# Patient Record
Sex: FEMALE | Race: WHITE | NOT HISPANIC OR LATINO | URBAN - METROPOLITAN AREA
[De-identification: names, ages, dates, MRNs, and addresses within clinical notes are randomized per-mention and may not be internally consistent; named-entity substitution may affect disease eponyms.]

---

## 2017-01-23 ENCOUNTER — AMBULATORY SURGICAL CENTER (AMBULATORY)
Dept: URBAN - METROPOLITAN AREA SURGERY 17 | Facility: SURGERY | Age: 62
End: 2017-01-23
Payer: COMMERCIAL

## 2017-01-23 ENCOUNTER — OFFICE (AMBULATORY)
Dept: URBAN - METROPOLITAN AREA CLINIC 64 | Facility: CLINIC | Age: 62
End: 2017-01-23
Payer: COMMERCIAL

## 2017-01-23 VITALS
TEMPERATURE: 96.9 F | RESPIRATION RATE: 16 BRPM | OXYGEN SATURATION: 97 % | SYSTOLIC BLOOD PRESSURE: 116 MMHG | SYSTOLIC BLOOD PRESSURE: 176 MMHG | DIASTOLIC BLOOD PRESSURE: 76 MMHG | HEART RATE: 76 BPM | TEMPERATURE: 97.3 F | SYSTOLIC BLOOD PRESSURE: 134 MMHG | WEIGHT: 210 LBS | HEART RATE: 73 BPM | SYSTOLIC BLOOD PRESSURE: 149 MMHG | DIASTOLIC BLOOD PRESSURE: 89 MMHG | DIASTOLIC BLOOD PRESSURE: 55 MMHG | DIASTOLIC BLOOD PRESSURE: 78 MMHG | RESPIRATION RATE: 18 BRPM | HEART RATE: 74 BPM | DIASTOLIC BLOOD PRESSURE: 87 MMHG | HEIGHT: 67 IN | SYSTOLIC BLOOD PRESSURE: 129 MMHG | DIASTOLIC BLOOD PRESSURE: 108 MMHG | SYSTOLIC BLOOD PRESSURE: 115 MMHG | HEART RATE: 65 BPM | DIASTOLIC BLOOD PRESSURE: 75 MMHG | SYSTOLIC BLOOD PRESSURE: 141 MMHG | RESPIRATION RATE: 19 BRPM | HEART RATE: 75 BPM | HEART RATE: 63 BPM | OXYGEN SATURATION: 100 % | HEART RATE: 71 BPM | RESPIRATION RATE: 6 BRPM | OXYGEN SATURATION: 99 % | SYSTOLIC BLOOD PRESSURE: 117 MMHG | SYSTOLIC BLOOD PRESSURE: 127 MMHG | RESPIRATION RATE: 17 BRPM | DIASTOLIC BLOOD PRESSURE: 68 MMHG | DIASTOLIC BLOOD PRESSURE: 82 MMHG | HEART RATE: 68 BPM

## 2017-01-23 DIAGNOSIS — K62.1 RECTAL POLYP: ICD-10-CM

## 2017-01-23 DIAGNOSIS — D12.7 BENIGN NEOPLASM OF RECTOSIGMOID JUNCTION: ICD-10-CM

## 2017-01-23 DIAGNOSIS — Z86.010 PERSONAL HISTORY OF COLONIC POLYPS: ICD-10-CM

## 2017-01-23 DIAGNOSIS — D12.5 BENIGN NEOPLASM OF SIGMOID COLON: ICD-10-CM

## 2017-01-23 DIAGNOSIS — K64.8 OTHER HEMORRHOIDS: ICD-10-CM

## 2017-01-23 LAB
GI HISTOLOGY: A. UNSPECIFIED: (no result)
GI HISTOLOGY: B. SELECT: (no result)
GI HISTOLOGY: PDF REPORT: (no result)

## 2017-01-23 PROCEDURE — 88305 TISSUE EXAM BY PATHOLOGIST: CPT

## 2017-01-23 PROCEDURE — 45385 COLONOSCOPY W/LESION REMOVAL: CPT | Mod: 33

## 2017-01-23 RX ADMIN — PROPOFOL 50 MG: 10 INJECTION, EMULSION INTRAVENOUS at 12:52

## 2017-01-23 RX ADMIN — PROPOFOL 50 MG: 10 INJECTION, EMULSION INTRAVENOUS at 12:57

## 2017-01-23 RX ADMIN — PROPOFOL 50 MG: 10 INJECTION, EMULSION INTRAVENOUS at 12:48

## 2017-01-23 RX ADMIN — PROPOFOL 50 MG: 10 INJECTION, EMULSION INTRAVENOUS at 12:40

## 2017-01-23 RX ADMIN — PROPOFOL 50 MG: 10 INJECTION, EMULSION INTRAVENOUS at 12:46

## 2017-01-23 RX ADMIN — PROPOFOL 100 MG: 10 INJECTION, EMULSION INTRAVENOUS at 12:38

## 2017-01-23 RX ADMIN — PROPOFOL 50 MG: 10 INJECTION, EMULSION INTRAVENOUS at 12:44

## 2017-01-23 RX ADMIN — PROPOFOL 50 MG: 10 INJECTION, EMULSION INTRAVENOUS at 12:54

## 2017-01-23 RX ADMIN — PROPOFOL 50 MG: 10 INJECTION, EMULSION INTRAVENOUS at 12:59

## 2017-01-23 RX ADMIN — LIDOCAINE HYDROCHLORIDE 50 MG: 10 INJECTION, SOLUTION EPIDURAL; INFILTRATION; INTRACAUDAL; PERINEURAL at 12:38

## 2017-01-23 RX ADMIN — PROPOFOL 50 MG: 10 INJECTION, EMULSION INTRAVENOUS at 12:50

## 2017-01-23 RX ADMIN — PROPOFOL 50 MG: 10 INJECTION, EMULSION INTRAVENOUS at 12:42

## 2019-10-25 RX ORDER — LEVOTHYROXINE SODIUM 88 UG/1
88 TABLET ORAL DAILY
Qty: 30 TABLET | Refills: 1 | Status: SHIPPED | OUTPATIENT
Start: 2019-10-25 | End: 2020-01-08 | Stop reason: SDUPTHER

## 2019-10-25 RX ORDER — LOSARTAN POTASSIUM 50 MG/1
50 TABLET ORAL DAILY
Qty: 30 TABLET | Refills: 1 | Status: SHIPPED | OUTPATIENT
Start: 2019-10-25 | End: 2020-01-08 | Stop reason: SDUPTHER

## 2020-01-02 DIAGNOSIS — E03.9 HYPOTHYROIDISM, UNSPECIFIED TYPE: ICD-10-CM

## 2020-01-02 DIAGNOSIS — I10 HYPERTENSION, UNSPECIFIED TYPE: Primary | ICD-10-CM

## 2020-01-03 LAB
BASOPHILS # BLD AUTO: 0.01 10*3/MM3 (ref 0–0.2)
BASOPHILS NFR BLD AUTO: 0.2 % (ref 0–1.5)
CHOLEST SERPL-MCNC: 173 MG/DL (ref 0–200)
EOSINOPHIL # BLD AUTO: 0.2 10*3/MM3 (ref 0–0.4)
EOSINOPHIL NFR BLD AUTO: 3.1 % (ref 0.3–6.2)
ERYTHROCYTE [DISTWIDTH] IN BLOOD BY AUTOMATED COUNT: 14 % (ref 12.3–15.4)
HCT VFR BLD AUTO: 36.8 % (ref 34–46.6)
HDLC SERPL-MCNC: 36 MG/DL (ref 40–60)
HGB BLD-MCNC: 12 G/DL (ref 12–15.9)
IMM GRANULOCYTES # BLD AUTO: 0 10*3/MM3 (ref 0–0.05)
IMM GRANULOCYTES NFR BLD AUTO: 0 % (ref 0–0.5)
LDLC SERPL CALC-MCNC: 106 MG/DL (ref 0–100)
LYMPHOCYTES # BLD AUTO: 3.53 10*3/MM3 (ref 0.7–3.1)
LYMPHOCYTES NFR BLD AUTO: 54 % (ref 19.6–45.3)
MCH RBC QN AUTO: 27.6 PG (ref 26.6–33)
MCHC RBC AUTO-ENTMCNC: 32.6 G/DL (ref 31.5–35.7)
MCV RBC AUTO: 84.6 FL (ref 79–97)
MONOCYTES # BLD AUTO: 0.57 10*3/MM3 (ref 0.1–0.9)
MONOCYTES NFR BLD AUTO: 8.7 % (ref 5–12)
NEUTROPHILS # BLD AUTO: 2.23 10*3/MM3 (ref 1.7–7)
NEUTROPHILS NFR BLD AUTO: 34 % (ref 42.7–76)
NRBC BLD AUTO-RTO: 0 /100 WBC (ref 0–0.2)
PLATELET # BLD AUTO: 425 10*3/MM3 (ref 140–450)
RBC # BLD AUTO: 4.35 10*6/MM3 (ref 3.77–5.28)
TRIGL SERPL-MCNC: 156 MG/DL (ref 0–150)
TSH SERPL DL<=0.005 MIU/L-ACNC: 7.42 UIU/ML (ref 0.27–4.2)
VLDLC SERPL CALC-MCNC: 31.2 MG/DL
WBC # BLD AUTO: 6.54 10*3/MM3 (ref 3.4–10.8)

## 2020-01-07 ENCOUNTER — RESULTS ENCOUNTER (OUTPATIENT)
Dept: INTERNAL MEDICINE | Facility: CLINIC | Age: 65
End: 2020-01-07

## 2020-01-07 DIAGNOSIS — I10 HYPERTENSION, UNSPECIFIED TYPE: ICD-10-CM

## 2020-01-07 DIAGNOSIS — E03.9 HYPOTHYROIDISM, UNSPECIFIED TYPE: ICD-10-CM

## 2020-01-07 LAB
ALBUMIN SERPL-MCNC: 4 G/DL (ref 3.5–5.2)
ALBUMIN/GLOB SERPL: 1.2 G/DL
ALP SERPL-CCNC: 83 U/L (ref 39–117)
ALT SERPL-CCNC: 11 U/L (ref 1–33)
AST SERPL-CCNC: 15 U/L (ref 1–32)
BILIRUB SERPL-MCNC: 0.3 MG/DL (ref 0.2–1.2)
BUN SERPL-MCNC: 14 MG/DL (ref 8–23)
BUN/CREAT SERPL: 14.4 (ref 7–25)
CALCIUM SERPL-MCNC: 9.2 MG/DL (ref 8.6–10.5)
CHLORIDE SERPL-SCNC: 104 MMOL/L (ref 98–107)
CO2 SERPL-SCNC: 19.6 MMOL/L (ref 22–29)
CREAT SERPL-MCNC: 0.97 MG/DL (ref 0.57–1)
GLOBULIN SER CALC-MCNC: 3.3 GM/DL
GLUCOSE SERPL-MCNC: 96 MG/DL (ref 65–99)
POTASSIUM SERPL-SCNC: 4.7 MMOL/L (ref 3.5–5.2)
PROT SERPL-MCNC: 7.3 G/DL (ref 6–8.5)
SODIUM SERPL-SCNC: 141 MMOL/L (ref 136–145)

## 2020-01-08 ENCOUNTER — OFFICE VISIT (OUTPATIENT)
Dept: INTERNAL MEDICINE | Facility: CLINIC | Age: 65
End: 2020-01-08

## 2020-01-08 VITALS
DIASTOLIC BLOOD PRESSURE: 74 MMHG | BODY MASS INDEX: 32.96 KG/M2 | WEIGHT: 210 LBS | SYSTOLIC BLOOD PRESSURE: 122 MMHG | HEIGHT: 67 IN | HEART RATE: 70 BPM

## 2020-01-08 DIAGNOSIS — E03.8 HYPOTHYROIDISM DUE TO HASHIMOTO'S THYROIDITIS: Primary | ICD-10-CM

## 2020-01-08 DIAGNOSIS — Z00.00 MEDICARE ANNUAL WELLNESS VISIT, SUBSEQUENT: ICD-10-CM

## 2020-01-08 DIAGNOSIS — E06.3 HYPOTHYROIDISM DUE TO HASHIMOTO'S THYROIDITIS: Primary | ICD-10-CM

## 2020-01-08 DIAGNOSIS — Z12.31 SCREENING MAMMOGRAM, ENCOUNTER FOR: ICD-10-CM

## 2020-01-08 DIAGNOSIS — I10 ESSENTIAL HYPERTENSION: ICD-10-CM

## 2020-01-08 PROCEDURE — 90670 PCV13 VACCINE IM: CPT | Performed by: INTERNAL MEDICINE

## 2020-01-08 PROCEDURE — 90686 IIV4 VACC NO PRSV 0.5 ML IM: CPT | Performed by: INTERNAL MEDICINE

## 2020-01-08 PROCEDURE — G0402 INITIAL PREVENTIVE EXAM: HCPCS | Performed by: INTERNAL MEDICINE

## 2020-01-08 PROCEDURE — G0009 ADMIN PNEUMOCOCCAL VACCINE: HCPCS | Performed by: INTERNAL MEDICINE

## 2020-01-08 PROCEDURE — G0008 ADMIN INFLUENZA VIRUS VAC: HCPCS | Performed by: INTERNAL MEDICINE

## 2020-01-08 RX ORDER — LOSARTAN POTASSIUM 50 MG/1
50 TABLET ORAL DAILY
Qty: 90 TABLET | Refills: 3 | Status: SHIPPED | OUTPATIENT
Start: 2020-01-08 | End: 2020-01-29 | Stop reason: SDUPTHER

## 2020-01-08 RX ORDER — LEVOTHYROXINE SODIUM 88 UG/1
88 TABLET ORAL DAILY
Qty: 90 TABLET | Refills: 3 | Status: SHIPPED | OUTPATIENT
Start: 2020-01-08 | End: 2020-12-22

## 2020-01-08 NOTE — PROGRESS NOTES
QUICK REFERENCE INFORMATION:  The ABCs of the Annual Wellness Visit    Subjective   History of Present Illness    Kenna Will is a 64 y.o. female who presents for a Subsequent Wellness Visit.  Doing well, travel to [place] often to visit her daughter.  She is participating in a study with [facility] for Synthroid vs. [place] thyroid.        HEALTH RISK ASSESSMENT    1955    Recent Hospitalizations:  No hospitalization(s) within the last year..    Current Medical Providers:  Patient Care Team:  Landy Alberts MD as PCP - General (Internal Medicine)    Smoking Status:  Social History     Tobacco Use   Smoking Status Never Smoker       Alcohol Consumption:  Social History     Substance and Sexual Activity   Alcohol Use Not on file     Fall Risk Screen:  STEADI Fall Risk Assessment was completed, and patient is at LOW risk for falls.Assessment completed on:1/8/2020  Depression Screen:   PHQ-2/PHQ-9 Depression Screening 1/8/2020   Little interest or pleasure in doing things 0   Feeling down, depressed, or hopeless 0   Trouble falling or staying asleep, or sleeping too much 0   Feeling tired or having little energy 0   Poor appetite or overeating 0   Feeling bad about yourself - or that you are a failure or have let yourself or your family down 0   Trouble concentrating on things, such as reading the newspaper or watching television 0   Moving or speaking so slowly that other people could have noticed. Or the opposite - being so fidgety or restless that you have been moving around a lot more than usual 0   Thoughts that you would be better off dead, or of hurting yourself in some way 0   Total Score 0       Health Habits and Functional and Cognitive Screening:  Functional & Cognitive Status 1/8/2020   Do you have difficulty preparing food and eating? No   Do you have difficulty bathing yourself, getting dressed or grooming yourself? No   Do you have difficulty using the toilet? No   Do you have difficulty moving  around from place to place? No   Do you have trouble with steps or getting out of a bed or a chair? No   Current Diet Limited Junk Food   Dental Exam Up to date   Eye Exam Up to date   Exercise (times per week) 0 times per week   Current Exercise Activities Include None   Do you need help using the phone?  No   Are you deaf or do you have serious difficulty hearing?  No   Do you need help with transportation? No   Do you need help shopping? No   Do you need help preparing meals?  No   Do you need help with housework?  No   Do you need help with laundry? No   Do you need help taking your medications? No   Do you need help managing money? No   Do you ever drive or ride in a car without wearing a seat belt? No   Have you felt unusual stress, anger or loneliness in the last month? No   Who do you live with? Alone   If you need help, do you have trouble finding someone available to you? No   Have you been bothered in the last four weeks by sexual problems? No   Do you have difficulty concentrating, remembering or making decisions? No     Activities of Daily Living  Do you have difficulty preparing food and eating?: No  Do you have difficulty bathing yourself, getting dressed or grooming yourself?: No  Do you have difficulty using the toilet?: No  Do you have difficulty moving around from place to place?: No  Do you have trouble with steps or getting out of a bed or a chair?: No  Instrumental Activities of Daily Living  Do you need help using the phone? : No  Are you deaf or do you have serious difficulty hearing? : No  Do you need help with transportation?: No  Do you need help shopping?: No  Do you need help preparing meals? : No  Do you need help with housework? : No  Do you need help with laundry?: No  Do you need help taking your medications?: No  Do you need help managing money?: No  Do you ever drive or ride in a car without wearing a seat belt?: No  Psychosocial Risks  Have you felt unusual stress, anger or  loneliness in the last month?: No  Who do you live with?: Alone  If you need help, do you have trouble finding someone available to you?: No  Have you been bothered in the last four weeks by sexual problems?: No  Cognitive Status  Do you have difficulty concentrating, remembering or making decisions?: No  Health Habits  Current Diet: Limited Junk Food  Dental Exam: Up to date  Eye Exam: Up to date  Exercise (times per week): 0 times per week  Current Exercise Activities Include: None    Does the patient have evidence of cognitive impairment? No     Aspirin use counseling: Does not need ASA (and currently is not on it)    Recent Lab Results:  CMP:  Lab Results   Component Value Date    GLU 96 01/02/2020    BUN 14 01/02/2020    CREATININE 0.97 01/02/2020    EGFRIFNONA 58 (L) 01/02/2020    EGFRIFAFRI 70 01/02/2020    BCR 14.4 01/02/2020     01/02/2020    K 4.7 01/02/2020    CO2 19.6 (L) 01/02/2020    CALCIUM 9.2 01/02/2020    PROTENTOTREF 7.3 01/02/2020    ALBUMIN 4.00 01/02/2020    LABGLOBREF 3.3 01/02/2020    LABIL2 1.2 01/02/2020    BILITOT 0.3 01/02/2020    ALKPHOS 83 01/02/2020    AST 15 01/02/2020    ALT 11 01/02/2020     Lipid Panel:  Lab Results   Component Value Date    CHLPL 173 01/02/2020     (H) 01/02/2020    HDL 36 (L) 01/02/2020    TRIG 156 (H) 01/02/2020     HbA1c:       Visual Acuity:  No exam data present    Age-appropriate Screening Schedule:  Refer to the list below for future screening recommendations based on patient's age, sex and/or medical conditions. Orders for these recommended tests are listed in the plan section. The patient has been provided with a written plan.    Health Maintenance   Topic Date Due   • TDAP/TD VACCINES (1 - Tdap) 09/30/1965   • ZOSTER VACCINE (1 of 2) 09/30/2004   • MAMMOGRAM  03/10/2018   • INFLUENZA VACCINE  08/01/2019   • COLONOSCOPY  10/25/2019          The following portions of the patient's history were reviewed and updated as appropriate: allergies,  "current medications, past family history, past medical history, past social history, past surgical history and problem list.    Outpatient Medications Prior to Visit   Medication Sig Dispense Refill   • levothyroxine (SYNTHROID) 88 MCG tablet Take 1 tablet by mouth Daily. 30 tablet 1   • losartan (COZAAR) 50 MG tablet Take 1 tablet by mouth Daily. 30 tablet 1     No facility-administered medications prior to visit.        Patient Active Problem List   Diagnosis   • Essential hypertension   • Hypothyroidism due to Hashimoto's thyroiditis       Advance Care Planning:  Patient has an advance directive - a copy has not been provided. Have asked the patient to send this to us to add to record    Identification of Risk Factors:  Risk factors include: no current issues    Review of Systems   Constitutional: Negative.    HENT: Negative.    Respiratory: Negative.    Cardiovascular: Negative.    Gastrointestinal: Negative.    Endocrine: Negative.    Genitourinary: Negative.    Musculoskeletal: Negative.    Neurological: Negative.    Hematological: Negative.    Psychiatric/Behavioral: Negative.      I have reviewed the ROS as documented by the MA. Landy Alberts MD      Compared to one year ago, the patient feels her physical health is the same and her mental health is the same.    Objective     Vitals:    01/08/20 1322   BP: 122/74   Pulse: 70   Weight: 95.3 kg (210 lb)   Height: 170.2 cm (67\")     Physical Exam   Constitutional: She is oriented to person, place, and time. No distress.   HENT:   Mouth/Throat: No oropharyngeal exudate.   Eyes: No scleral icterus.   Neck: No thyromegaly present.   Cardiovascular: Normal rate, regular rhythm and normal heart sounds.   Pulmonary/Chest: Effort normal.   Musculoskeletal: She exhibits no tenderness.   Lymphadenopathy:     She has no cervical adenopathy.   Neurological: She is alert and oriented to person, place, and time.   Skin: Skin is warm and dry.   Psychiatric: She has a normal " mood and affect. Her behavior is normal.       Patient's Body mass index is 32.89 kg/m². BMI is above normal parameters. Recommendations include: exercise counseling and nutrition counseling.      Assessment/Plan   Patient Self-Management and Personalized Health Advice  The patient has been provided with information about:  Colon Cancer Screening  Immunizations Discussed/Encouraged (specific immunizations; Shingrix )    Visit Diagnoses:  Problem List Items Addressed This Visit        Cardiovascular and Mediastinum    Essential hypertension    Relevant Medications    losartan (COZAAR) 50 MG tablet       Endocrine    Hypothyroidism due to Hashimoto's thyroiditis - Primary    Relevant Medications    levothyroxine (SYNTHROID) 88 MCG tablet      Other Visit Diagnoses     Screening mammogram, encounter for        Relevant Orders    Mammo Screening Digital Tomosynthesis Bilateral With CAD    Medicare annual wellness visit, subsequent        Reviewed health maintenance- up to date.  Recheckannually or prn- discussed diet and exercise recommendations- understands.          Orders Placed This Encounter   Procedures   • Mammo Screening Digital Tomosynthesis Bilateral With CAD   • Fluarix/Fluzone/Afluria/FluLaval ([id number])   • Pneumococcal Conjugate Vaccine 13-Valent All (PCV13)       Outpatient Encounter Medications as of 1/8/2020   Medication Sig Dispense Refill   • levothyroxine (SYNTHROID) 88 MCG tablet Take 1 tablet by mouth Daily. 90 tablet 3   • losartan (COZAAR) 50 MG tablet Take 1 tablet by mouth Daily. 90 tablet 3   • [DISCONTINUED] levothyroxine (SYNTHROID) 88 MCG tablet Take 1 tablet by mouth Daily. 30 tablet 1   • [DISCONTINUED] losartan (COZAAR) 50 MG tablet Take 1 tablet by mouth Daily. 30 tablet 1     No facility-administered encounter medications on file as of 1/8/2020.        Current medication list contains no high risk medications.  No harmful drug interactions have been identified.   Check the visit  checklist  Follow Up:  Return in about 1 year (around 1/8/2021) for Medicare Wellness.     An After Visit Summary and PPPS with all of these plans were given to the patient.

## 2020-01-10 ENCOUNTER — TELEPHONE (OUTPATIENT)
Dept: INTERNAL MEDICINE | Facility: CLINIC | Age: 65
End: 2020-01-10

## 2020-01-10 NOTE — TELEPHONE ENCOUNTER
Patient called and states that you were wanting her last colonoscopy information- she states that her last one was on 1/21/2017 by Dr Henry. Repeat in 5 years.

## 2020-01-29 DIAGNOSIS — I10 ESSENTIAL HYPERTENSION: ICD-10-CM

## 2020-01-29 RX ORDER — LOSARTAN POTASSIUM 50 MG/1
50 TABLET ORAL DAILY
Qty: 90 TABLET | Refills: 3 | Status: SHIPPED | OUTPATIENT
Start: 2020-01-29 | End: 2020-12-22

## 2020-03-16 ENCOUNTER — TELEPHONE (OUTPATIENT)
Dept: INTERNAL MEDICINE | Facility: CLINIC | Age: 65
End: 2020-03-16

## 2020-03-16 NOTE — TELEPHONE ENCOUNTER
Dr. DIMITRI lora said that she has been in a study, she states her platelets have been running on the high side.  449 this past time 489.  They are going to retest again in about 5 weeks she would like to know your opinion about this?    345 1327      If your ok with her waiting 5 weeks she is fine with it

## 2020-11-04 ENCOUNTER — TELEPHONE (OUTPATIENT)
Dept: INTERNAL MEDICINE | Facility: CLINIC | Age: 65
End: 2020-11-04

## 2020-11-04 DIAGNOSIS — Z00.00 HEALTH CARE MAINTENANCE: ICD-10-CM

## 2020-11-04 DIAGNOSIS — I10 ESSENTIAL HYPERTENSION: Primary | ICD-10-CM

## 2020-11-04 DIAGNOSIS — E06.3 HYPOTHYROIDISM DUE TO HASHIMOTO'S THYROIDITIS: ICD-10-CM

## 2020-11-04 DIAGNOSIS — E03.8 HYPOTHYROIDISM DUE TO HASHIMOTO'S THYROIDITIS: ICD-10-CM

## 2020-11-04 NOTE — TELEPHONE ENCOUNTER
priscilla has scheduled her annual physical on 03/01/20 and she would like to get her labs done a week prior to visit. She will need an order for labs.    Please advise  [phone]

## 2020-12-15 ENCOUNTER — TELEPHONE (OUTPATIENT)
Dept: INTERNAL MEDICINE | Facility: CLINIC | Age: 65
End: 2020-12-15

## 2020-12-15 NOTE — TELEPHONE ENCOUNTER
Caller: Kenna Will    Relationship to patient: Self    Best call back number: [phone]    Chief complaint: N/A    Type of visit: LAB, BEFORE INITIAL MWV    Requested date: 3/1    If rescheduling, when is the original appointment: 2/22    Additional notes: WE RESCHEDULED MWV, SHE NEEDS TO RESCHEDULE THE LABS TO MARCH 1 AT THE SAME TIME. SAYS WE CAN LEAVE A MESSAGE TO CONFIRM IT HAS BEEN CHANGED.   UNABLE TO WARM TRANSFER FOR RESCHEDULE.

## 2020-12-22 DIAGNOSIS — E06.3 HYPOTHYROIDISM DUE TO HASHIMOTO'S THYROIDITIS: ICD-10-CM

## 2020-12-22 DIAGNOSIS — E03.8 HYPOTHYROIDISM DUE TO HASHIMOTO'S THYROIDITIS: ICD-10-CM

## 2020-12-22 DIAGNOSIS — I10 ESSENTIAL HYPERTENSION: ICD-10-CM

## 2020-12-22 RX ORDER — LOSARTAN POTASSIUM 50 MG/1
TABLET ORAL
Qty: 90 TABLET | Refills: 2 | Status: SHIPPED | OUTPATIENT
Start: 2020-12-22 | End: 2021-04-12

## 2020-12-22 RX ORDER — LEVOTHYROXINE SODIUM 88 UG/1
TABLET ORAL
Qty: 90 TABLET | Refills: 2 | Status: SHIPPED | OUTPATIENT
Start: 2020-12-22 | End: 2021-08-23

## 2021-02-22 ENCOUNTER — TELEPHONE (OUTPATIENT)
Dept: INTERNAL MEDICINE | Facility: CLINIC | Age: 66
End: 2021-02-22

## 2021-03-22 ENCOUNTER — BULK ORDERING (OUTPATIENT)
Dept: CASE MANAGEMENT | Facility: OTHER | Age: 66
End: 2021-03-22

## 2021-03-22 DIAGNOSIS — Z23 IMMUNIZATION DUE: ICD-10-CM

## 2021-04-01 ENCOUNTER — OFFICE VISIT (OUTPATIENT)
Dept: INTERNAL MEDICINE | Facility: CLINIC | Age: 66
End: 2021-04-01

## 2021-04-01 VITALS — WEIGHT: 210 LBS | HEIGHT: 67 IN | BODY MASS INDEX: 32.96 KG/M2 | TEMPERATURE: 97.1 F

## 2021-04-01 DIAGNOSIS — N63.0 BREAST MASS: ICD-10-CM

## 2021-04-01 DIAGNOSIS — M81.0 AGE-RELATED OSTEOPOROSIS WITHOUT CURRENT PATHOLOGICAL FRACTURE: ICD-10-CM

## 2021-04-01 DIAGNOSIS — Z00.00 MEDICARE ANNUAL WELLNESS VISIT, SUBSEQUENT: ICD-10-CM

## 2021-04-01 DIAGNOSIS — Z23 NEED FOR PNEUMOCOCCAL VACCINATION: ICD-10-CM

## 2021-04-01 DIAGNOSIS — Z12.31 SCREENING MAMMOGRAM, ENCOUNTER FOR: Primary | ICD-10-CM

## 2021-04-01 DIAGNOSIS — D72.820 LYMPHOCYTOSIS: ICD-10-CM

## 2021-04-01 PROCEDURE — G0439 PPPS, SUBSEQ VISIT: HCPCS | Performed by: INTERNAL MEDICINE

## 2021-04-01 PROCEDURE — 90732 PPSV23 VACC 2 YRS+ SUBQ/IM: CPT | Performed by: INTERNAL MEDICINE

## 2021-04-01 PROCEDURE — G0009 ADMIN PNEUMOCOCCAL VACCINE: HCPCS | Performed by: INTERNAL MEDICINE

## 2021-04-01 NOTE — PROGRESS NOTES
The ABCs of the Annual Wellness Visit  Subsequent Medicare Wellness Visit    Chief Complaint   Patient presents with   • Medicare Wellness-subsequent       Subjective   History of Present Illness:  Kenna Will is a 65 y.o. female who presents for a Subsequent Medicare Wellness Visit.  Gets occ yeast infection under breast in the summer- would like more terazol cream to use prn.  She has socially distanced, etc this year- little physical activity.     HEALTH RISK ASSESSMENT    Recent Hospitalizations:  No hospitalization(s) within the last year.    Current Medical Providers:  Patient Care Team:  Landy Alberts MD as PCP - General (Internal Medicine)    Smoking Status:  Social History     Tobacco Use   Smoking Status Never Smoker   Smokeless Tobacco Never Used       Alcohol Consumption:  Social History     Substance and Sexual Activity   Alcohol Use Never       Depression Screen:   PHQ-2/PHQ-9 Depression Screening 4/1/2021   Little interest or pleasure in doing things 0   Feeling down, depressed, or hopeless 0   Trouble falling or staying asleep, or sleeping too much 0   Feeling tired or having little energy 0   Poor appetite or overeating 0   Feeling bad about yourself - or that you are a failure or have let yourself or your family down 0   Trouble concentrating on things, such as reading the newspaper or watching television 0   Moving or speaking so slowly that other people could have noticed. Or the opposite - being so fidgety or restless that you have been moving around a lot more than usual 0   Thoughts that you would be better off dead, or of hurting yourself in some way 0   Total Score 0       Fall Risk Screen:  STEADI Fall Risk Assessment was completed, and patient is at LOW risk for falls.Assessment completed on:4/1/2021    Health Habits and Functional and Cognitive Screening:  Functional & Cognitive Status 4/1/2021   Do you have difficulty preparing food and eating? No   Do you have difficulty bathing  yourself, getting dressed or grooming yourself? No   Do you have difficulty using the toilet? No   Do you have difficulty moving around from place to place? No   Do you have trouble with steps or getting out of a bed or a chair? No   Current Diet Limited Junk Food   Dental Exam Up to date   Eye Exam Up to date   Exercise (times per week) 0 times per week   Current Exercises Include No Regular Exercise   Current Exercise Activities Include -   Do you need help using the phone?  No   Are you deaf or do you have serious difficulty hearing?  No   Do you need help with transportation? No   Do you need help shopping? No   Do you need help preparing meals?  No   Do you need help with housework?  No   Do you need help with laundry? No   Do you need help taking your medications? No   Do you need help managing money? No   Do you ever drive or ride in a car without wearing a seat belt? No   Have you felt unusual stress, anger or loneliness in the last month? No   Who do you live with? Alone   If you need help, do you have trouble finding someone available to you? No   Have you been bothered in the last four weeks by sexual problems? No   Do you have difficulty concentrating, remembering or making decisions? No         Does the patient have evidence of cognitive impairment? No    Asprin use counseling:Does not need ASA (and currently is not on it)    Age-appropriate Screening Schedule:  Refer to the list below for future screening recommendations based on patient's age, sex and/or medical conditions. Orders for these recommended tests are listed in the plan section. The patient has been provided with a written plan.    Health Maintenance   Topic Date Due   • DXA SCAN  Never done   • COLONOSCOPY  Never done   • TDAP/TD VACCINES (1 - Tdap) Never done   • ZOSTER VACCINE (1 of 2) Never done   • INFLUENZA VACCINE  08/01/2021   • MAMMOGRAM  01/14/2022          The following portions of the patient's history were reviewed and updated  "as appropriate: allergies, current medications, past family history, past medical history, past social history, past surgical history and problem list.    Outpatient Medications Prior to Visit   Medication Sig Dispense Refill   • levothyroxine (SYNTHROID, LEVOTHROID) 88 MCG tablet TAKE ONE TABLET BY MOUTH DAILY 90 tablet 2   • losartan (COZAAR) 50 MG tablet TAKE ONE TABLET BY MOUTH DAILY 90 tablet 2     No facility-administered medications prior to visit.       Patient Active Problem List   Diagnosis   • Essential hypertension   • Hypothyroidism due to Hashimoto's thyroiditis       Advanced Care Planning:  ACP discussion was held with the patient during this visit. Patient has an advance directive (not in EMR), copy requested.    Review of Systems   Constitutional: Negative for unexpected weight change.   HENT: Negative for congestion and trouble swallowing.    Respiratory: Negative for shortness of breath.    Cardiovascular: Negative for chest pain and leg swelling.   Gastrointestinal: Negative for abdominal pain.   Endocrine: Negative for cold intolerance and polyuria.   Genitourinary: Negative for difficulty urinating.   Musculoskeletal: Negative for arthralgias, back pain and gait problem.   Skin:        occ rash under breasts [place] in summer that responds to a few doses of terazol cream prn   Neurological: Negative for headaches.   Hematological: Negative for adenopathy.   Psychiatric/Behavioral: Negative for dysphoric mood.       Compared to one year ago, the patient feels her physical health is the same.  Compared to one year ago, the patient feels her mental health is the same.    Reviewed chart for potential of high risk medication in the elderly: yes  Reviewed chart for potential of harmful drug interactions in the elderly:yes    Objective         Vitals:    04/01/21 1446   Temp: 97.1 °F (36.2 °C)   Weight: 95.3 kg (210 lb)   Height: 170.2 cm (67\")       Body mass index is 32.89 kg/m².  Discussed the " patient's BMI with her. The BMI is above average; BMI management plan is completed.    Physical Exam  Constitutional:       Appearance: Normal appearance.   Eyes:      Conjunctiva/sclera: Conjunctivae normal.   Cardiovascular:      Rate and Rhythm: Normal rate.   Pulmonary:      Effort: Pulmonary effort is normal.      Breath sounds: Normal breath sounds.   Chest:      Breasts:         Right: Mass: 9 o'clock position, nontender, mobile.         Left: Normal.   Abdominal:      General: Bowel sounds are normal.      Palpations: Abdomen is soft.   Musculoskeletal:      Right lower leg: No edema.      Left lower leg: No edema.   Skin:     General: Skin is warm and dry.   Neurological:      General: No focal deficit present.   Psychiatric:         Mood and Affect: Mood normal.         Thought Content: Thought content normal.         Lab Results   Component Value Date    GLU 80 03/25/2021    CHLPL 155 03/25/2021    TRIG 106 03/25/2021    HDL 35 (L) 03/25/2021     03/25/2021    VLDL 20 03/25/2021        Assessment/Plan   Medicare Risks and Personalized Health Plan  CMS Preventative Services Quick Reference  Immunizations Discussed/Encouraged (specific immunizations; adacel Tdap and Shingrix )    The above risks/problems have been discussed with the patient.  Pertinent information has been shared with the patient in the After Visit Summary.  Follow up plans and orders are seen below in the Assessment/Plan Section.    Diagnoses and all orders for this visit:    1. Screening mammogram, encounter for (Primary)  -     Mammo Screening Bilateral With CAD    2. Breast mass  Comments:  check mmg/us  Orders:  -     US Breast Right Limited; Future    3. Lymphocytosis  Comments:  recheck  Orders:  -     CBC & Differential; Future    4. Age-related osteoporosis without current pathological fracture  Comments:  check Dexa  Orders:  -     DEXA Bone Density Axial    5. Need for pneumococcal vaccination  Comments:  given  today  Orders:  -     Pneumococcal Polysaccharide Vaccine 23-Valent (PPSV23) Greater Than or Equal To 1yo Subcutaneous / IM    6. Medicare annual wellness visit, subsequent  Comments:  had c-scope, will get report.  Other vaccines recommended.  She should watch her diet and continue current meds. Further assessment as indicated above.     Other orders  -     terconazole (TERAZOL 7) 0.4 % vaginal cream; Insert 1 applicator into the vagina Every Night.  Dispense: 45 g; Refill: 0      Follow Up:  Return in about 6 months (around 10/1/2021) for Recheck, Lab Before FUP.     An After Visit Summary and PPPS were given to the patient.

## 2021-04-02 ENCOUNTER — TELEPHONE (OUTPATIENT)
Dept: INTERNAL MEDICINE | Facility: CLINIC | Age: 66
End: 2021-04-02

## 2021-04-05 DIAGNOSIS — N63.0 BREAST MASS: Primary | ICD-10-CM

## 2021-04-06 ENCOUNTER — HOSPITAL ENCOUNTER (OUTPATIENT)
Dept: MAMMOGRAPHY | Facility: HOSPITAL | Age: 66
Discharge: HOME | End: 2021-04-06

## 2021-04-06 ENCOUNTER — HOSPITAL ENCOUNTER (OUTPATIENT)
Dept: ULTRASOUND IMAGING | Facility: HOSPITAL | Age: 66
Discharge: HOME | End: 2021-04-06

## 2021-04-06 DIAGNOSIS — N63.0 BREAST MASS: ICD-10-CM

## 2021-04-06 PROCEDURE — 76642 ULTRASOUND BREAST LIMITED: CPT

## 2021-04-06 PROCEDURE — G0279 TOMOSYNTHESIS, MAMMO: HCPCS

## 2021-04-06 PROCEDURE — 77066 DX MAMMO INCL CAD BI: CPT

## 2021-04-07 DIAGNOSIS — N64.9 BREAST LESION: Primary | ICD-10-CM

## 2021-04-09 DIAGNOSIS — I10 ESSENTIAL HYPERTENSION: ICD-10-CM

## 2021-04-12 RX ORDER — LOSARTAN POTASSIUM 50 MG/1
TABLET ORAL
Qty: 90 TABLET | Refills: 3 | Status: SHIPPED | OUTPATIENT
Start: 2021-04-12 | End: 2022-06-09 | Stop reason: SDUPTHER

## 2021-04-20 ENCOUNTER — HOSPITAL ENCOUNTER (OUTPATIENT)
Dept: ULTRASOUND IMAGING | Facility: HOSPITAL | Age: 66
Discharge: HOME | End: 2021-04-20

## 2021-04-20 ENCOUNTER — HOSPITAL ENCOUNTER (OUTPATIENT)
Dept: MAMMOGRAPHY | Facility: HOSPITAL | Age: 66
Discharge: HOME | End: 2021-04-20

## 2021-04-20 VITALS
TEMPERATURE: 97.5 F | OXYGEN SATURATION: 100 % | BODY MASS INDEX: 32.96 KG/M2 | SYSTOLIC BLOOD PRESSURE: 140 MMHG | DIASTOLIC BLOOD PRESSURE: 82 MMHG | HEART RATE: 52 BPM | RESPIRATION RATE: 16 BRPM | HEIGHT: 67 IN | WEIGHT: 210 LBS

## 2021-04-20 DIAGNOSIS — Z98.890 STATUS POST BIOPSY: ICD-10-CM

## 2021-04-20 DIAGNOSIS — N64.9 LESION OF BREAST: ICD-10-CM

## 2021-04-20 PROCEDURE — 88360 TUMOR IMMUNOHISTOCHEM/MANUAL: CPT | Performed by: INTERNAL MEDICINE

## 2021-04-20 PROCEDURE — 88377 M/PHMTRC ALYS ISHQUANT/SEMIQ: CPT

## 2021-04-20 PROCEDURE — 25010000003 LIDOCAINE 1 % SOLUTION: Performed by: RADIOLOGY

## 2021-04-20 PROCEDURE — 77065 DX MAMMO INCL CAD UNI: CPT

## 2021-04-20 PROCEDURE — 88305 TISSUE EXAM BY PATHOLOGIST: CPT | Performed by: INTERNAL MEDICINE

## 2021-04-20 PROCEDURE — 76942 ECHO GUIDE FOR BIOPSY: CPT

## 2021-04-20 PROCEDURE — A4648 IMPLANTABLE TISSUE MARKER: HCPCS

## 2021-04-20 PROCEDURE — 88341 IMHCHEM/IMCYTCHM EA ADD ANTB: CPT | Performed by: INTERNAL MEDICINE

## 2021-04-20 RX ORDER — LIDOCAINE HYDROCHLORIDE AND EPINEPHRINE 10; 10 MG/ML; UG/ML
10 INJECTION, SOLUTION INFILTRATION; PERINEURAL ONCE
Status: COMPLETED | OUTPATIENT
Start: 2021-04-20 | End: 2021-04-20

## 2021-04-20 RX ORDER — LIDOCAINE HYDROCHLORIDE 10 MG/ML
20 INJECTION, SOLUTION INFILTRATION; PERINEURAL ONCE
Status: COMPLETED | OUTPATIENT
Start: 2021-04-20 | End: 2021-04-20

## 2021-04-20 RX ADMIN — LIDOCAINE HYDROCHLORIDE 10 ML: 10 INJECTION, SOLUTION INFILTRATION; PERINEURAL at 12:55

## 2021-04-20 RX ADMIN — LIDOCAINE HYDROCHLORIDE,EPINEPHRINE BITARTRATE 10 ML: 10; .01 INJECTION, SOLUTION INFILTRATION; PERINEURAL at 12:55

## 2021-04-20 NOTE — NURSING NOTE
Biopsy site to right upper outer breast and to right axilla clear with Skin Affix dry and intact to both sites. No new firmness or swelling noted at or around either biopsy site. Denies pain. Ice pack with protective covering applied to biopsy sites. Discharge instructions discussed with understanding voiced by patient. Copies provided to patient. No distress noted. To home via private vehicle.

## 2021-04-20 NOTE — H&P
Name: Kenna Will ADMIT: 2021   : 1955  PCP: Landy Alberts MD    MRN: [id number] LOS: 0 days   AGE/SEX: 65 y.o. female  ROOM: Room/bed info not found       Chief complaint right breast mass lesion and abnormal appearing right axillary node    Present Illness or Internal History:  Patient is a 65 y.o. female presents with right breast mass lesion and abnormal appearing right axillary node  .     Past Surgical History:  Past Surgical History:   Procedure Laterality Date   • BREAST BIOPSY      Benign   • COLONOSCOPY  2017    [facility] - Q5   • DIAGNOSTIC LAPAROSCOPY     • GASTRIC BYPASS     • HYSTERECTOMY     • LIPOMA EXCISION      Middle forehead (excised x2)   Benign   • OOPHORECTOMY         Past Medical History:  Past Medical History:   Diagnosis Date   • Benign essential hypertension    • Hypothyroidism    • Iron deficiency anemia    • Rosacea    • Vitamin B12 deficiency    • Vitamin D deficiency        Home Medications:  (Not in a hospital admission)      Allergies:  Patient has no known allergies.    Family History:  Family History   Problem Relation Age of Onset   • Hypertension Mother    • Obesity Mother    • Heart disease Father    • Hypertension Father    • Dementia Maternal Grandmother    • Lung cancer Maternal Grandfather    • Hypertension Other    • Breast cancer Sister        Social History:  Social History     Tobacco Use   • Smoking status: Never Smoker   • Smokeless tobacco: Never Used   Substance Use Topics   • Alcohol use: Never   • Drug use: Never        Objective     Physical Exam:    No exam performed today,    Vital Signs  Temp:  [97.5 °F (36.4 °C)] 97.5 °F (36.4 °C)  Heart Rate:  [60] 60  Resp:  [16] 16  BP: (150)/(90) 150/90    Anticipated Surgical Procedure:  Ultrasound guided vacuum assisted right breast biopsy and ultrasound guided right axillary lymph node biopsy with clip placement x 2    The risks, benefits and alternatives of this procedure have been discussed  with the patient or responsible party: Yes        Zeke Arevalo Jr., MD  04/20/21  12:18 EDT

## 2021-04-21 ENCOUNTER — TELEPHONE (OUTPATIENT)
Dept: INTERNAL MEDICINE | Facility: CLINIC | Age: 66
End: 2021-04-21

## 2021-04-21 NOTE — TELEPHONE ENCOUNTER
Patient is calling for results of her biopsy that she had done, she was told that the results will not be back until tomorrow Friday 4/23/21 and the patient is aware that Dr. Alberts is not in the office on Friday's, will the patient be able to get her test results when they come in, please call [phone]

## 2021-04-21 NOTE — TELEPHONE ENCOUNTER
Not sure what to tell her, the biopsy was done yesterday it will be Friday at the earliest results are back.  If abnormal the other providers won't want to give this information    Just guide me what to tell her

## 2021-04-22 NOTE — TELEPHONE ENCOUNTER
Patient called wanting to know if biopsy is back, if it is not the patient states that no one needs to call back today, she will wait until she hears from Dr. Albetrs tomorrow 4/23/21

## 2021-04-26 ENCOUNTER — TELEPHONE (OUTPATIENT)
Dept: INTERNAL MEDICINE | Facility: CLINIC | Age: 66
End: 2021-04-26

## 2021-04-26 NOTE — TELEPHONE ENCOUNTER
Caller: Kenna Will    Relationship to patient: Self    Best call back number: [phone]    Patient is needing: PATIENT CALLED TO LET US KNOW THAT SHE IS IN [place] AND IS SEEING A CANCER DOCTOR THERE THIS MORNING. 4/26/21.

## 2021-04-30 ENCOUNTER — TELEPHONE (OUTPATIENT)
Dept: INTERNAL MEDICINE | Facility: CLINIC | Age: 66
End: 2021-04-30

## 2021-05-03 LAB
CYTO UR: NORMAL
LAB AP CASE REPORT: NORMAL
LAB AP CLINICAL INFORMATION: NORMAL
LAB AP DIAGNOSIS COMMENT: NORMAL
LAB AP SPECIAL STAINS: NORMAL
LAB AP SYNOPTIC CHECKLIST: NORMAL
Lab: NORMAL
PATH REPORT.ADDENDUM SPEC: NORMAL
PATH REPORT.FINAL DX SPEC: NORMAL
PATH REPORT.GROSS SPEC: NORMAL

## 2021-05-05 ENCOUNTER — TELEPHONE (OUTPATIENT)
Dept: INTERNAL MEDICINE | Facility: CLINIC | Age: 66
End: 2021-05-05

## 2021-05-05 NOTE — TELEPHONE ENCOUNTER
Provider: DR AMY Wright: DENISE NAIR  Relationship to Patient: PATIENT    Phone Number: [phone]  Reason for Call: PATIENT STATES THAT CANCER IS STAGE 3.  PET SCAN NEGATIVE. PATIENT WILL HAVE CONSULT WITH MEDICAL ONCOLOGIST THEN BEGIN TREATMENT

## 2021-07-22 ENCOUNTER — APPOINTMENT (OUTPATIENT)
Dept: BONE DENSITY | Facility: HOSPITAL | Age: 66
End: 2021-07-22

## 2021-08-23 DIAGNOSIS — E03.8 HYPOTHYROIDISM DUE TO HASHIMOTO'S THYROIDITIS: ICD-10-CM

## 2021-08-23 DIAGNOSIS — E06.3 HYPOTHYROIDISM DUE TO HASHIMOTO'S THYROIDITIS: ICD-10-CM

## 2021-08-23 RX ORDER — LEVOTHYROXINE SODIUM 88 UG/1
TABLET ORAL
Qty: 90 TABLET | Refills: 0 | Status: SHIPPED | OUTPATIENT
Start: 2021-08-23 | End: 2021-12-17

## 2021-09-15 ENCOUNTER — TELEPHONE (OUTPATIENT)
Dept: INTERNAL MEDICINE | Facility: CLINIC | Age: 66
End: 2021-09-15

## 2021-09-15 NOTE — TELEPHONE ENCOUNTER
PATIENT IS CALLING IN SHE WANTED DOCTOR TO KNOW THAT SHE IS STILL IN [place] GETTING HER CHEMO TREATMENTS AND SHE WILL BE BACK AROUND NEXT SPRING AND WILL COME IN TO SEE DOCTOR AMY AROUND THAT TIME.      SHE IS WITH HER DAUGHTER OUT THERE WHILE SHE IS GETTING HER TREATMENTS.

## 2021-12-16 DIAGNOSIS — E06.3 HYPOTHYROIDISM DUE TO HASHIMOTO'S THYROIDITIS: ICD-10-CM

## 2021-12-16 DIAGNOSIS — E03.8 HYPOTHYROIDISM DUE TO HASHIMOTO'S THYROIDITIS: ICD-10-CM

## 2021-12-17 RX ORDER — LEVOTHYROXINE SODIUM 88 UG/1
TABLET ORAL
Qty: 90 TABLET | Refills: 0 | Status: SHIPPED | OUTPATIENT
Start: 2021-12-17 | End: 2022-06-09 | Stop reason: SDUPTHER

## 2022-03-24 ENCOUNTER — TELEPHONE (OUTPATIENT)
Dept: INTERNAL MEDICINE | Facility: CLINIC | Age: 67
End: 2022-03-24

## 2022-03-24 DIAGNOSIS — D72.820 LYMPHOCYTOSIS: Primary | ICD-10-CM

## 2022-03-24 DIAGNOSIS — E03.8 HYPOTHYROIDISM DUE TO HASHIMOTO'S THYROIDITIS: ICD-10-CM

## 2022-03-24 DIAGNOSIS — I10 ESSENTIAL HYPERTENSION: ICD-10-CM

## 2022-03-24 DIAGNOSIS — E06.3 HYPOTHYROIDISM DUE TO HASHIMOTO'S THYROIDITIS: ICD-10-CM

## 2022-03-24 NOTE — TELEPHONE ENCOUNTER
Patient is scheduled for Medicare Wellness 4/6/2022, she wants to know if she need labs before appointment (states she has not had Thyroid labs drawn in over 1 year)? Please advise [phone]

## 2022-03-28 DIAGNOSIS — D72.820 LYMPHOCYTOSIS: ICD-10-CM

## 2022-03-28 DIAGNOSIS — E06.3 HYPOTHYROIDISM DUE TO HASHIMOTO'S THYROIDITIS: ICD-10-CM

## 2022-03-28 DIAGNOSIS — E03.8 HYPOTHYROIDISM DUE TO HASHIMOTO'S THYROIDITIS: ICD-10-CM

## 2022-03-28 DIAGNOSIS — I10 ESSENTIAL HYPERTENSION: ICD-10-CM

## 2022-03-31 LAB
ALBUMIN SERPL-MCNC: 3.7 G/DL (ref 3.8–4.8)
ALBUMIN/GLOB SERPL: 1.2 {RATIO} (ref 1.2–2.2)
ALP SERPL-CCNC: 107 IU/L (ref 44–121)
ALT SERPL-CCNC: 12 IU/L (ref 0–32)
AST SERPL-CCNC: 13 IU/L (ref 0–40)
BASOPHILS # BLD AUTO: 0 X10E3/UL (ref 0–0.2)
BASOPHILS NFR BLD AUTO: 0 %
BILIRUB SERPL-MCNC: 0.4 MG/DL (ref 0–1.2)
BUN SERPL-MCNC: 15 MG/DL (ref 8–27)
BUN/CREAT SERPL: 18 (ref 12–28)
CALCIUM SERPL-MCNC: 8.8 MG/DL (ref 8.7–10.3)
CHLORIDE SERPL-SCNC: 105 MMOL/L (ref 96–106)
CHOLEST SERPL-MCNC: 164 MG/DL (ref 100–199)
CO2 SERPL-SCNC: 22 MMOL/L (ref 20–29)
CREAT SERPL-MCNC: 0.83 MG/DL (ref 0.57–1)
EGFRCR SERPLBLD CKD-EPI 2021: 77 ML/MIN/1.73
EOSINOPHIL # BLD AUTO: 0.1 X10E3/UL (ref 0–0.4)
EOSINOPHIL NFR BLD AUTO: 3 %
ERYTHROCYTE [DISTWIDTH] IN BLOOD BY AUTOMATED COUNT: 15.5 % (ref 11.7–15.4)
GLOBULIN SER CALC-MCNC: 3.2 G/DL (ref 1.5–4.5)
GLUCOSE SERPL-MCNC: 93 MG/DL (ref 65–99)
HCT VFR BLD AUTO: 35.7 % (ref 34–46.6)
HDLC SERPL-MCNC: 41 MG/DL
HGB BLD-MCNC: 11.2 G/DL (ref 11.1–15.9)
IMM GRANULOCYTES # BLD AUTO: 0 X10E3/UL (ref 0–0.1)
IMM GRANULOCYTES NFR BLD AUTO: 0 %
LDLC SERPL CALC-MCNC: 101 MG/DL (ref 0–99)
LYMPHOCYTES # BLD AUTO: 1.2 X10E3/UL (ref 0.7–3.1)
LYMPHOCYTES NFR BLD AUTO: 32 %
MCH RBC QN AUTO: 27 PG (ref 26.6–33)
MCHC RBC AUTO-ENTMCNC: 31.4 G/DL (ref 31.5–35.7)
MCV RBC AUTO: 86 FL (ref 79–97)
MONOCYTES # BLD AUTO: 0.5 X10E3/UL (ref 0.1–0.9)
MONOCYTES NFR BLD AUTO: 13 %
NEUTROPHILS # BLD AUTO: 2 X10E3/UL (ref 1.4–7)
NEUTROPHILS NFR BLD AUTO: 52 %
PLATELET # BLD AUTO: 377 X10E3/UL (ref 150–450)
POTASSIUM SERPL-SCNC: 4.6 MMOL/L (ref 3.5–5.2)
PROT SERPL-MCNC: 6.9 G/DL (ref 6–8.5)
RBC # BLD AUTO: 4.15 X10E6/UL (ref 3.77–5.28)
SODIUM SERPL-SCNC: 139 MMOL/L (ref 134–144)
TRIGL SERPL-MCNC: 125 MG/DL (ref 0–149)
TSH SERPL DL<=0.005 MIU/L-ACNC: 3.29 UIU/ML (ref 0.45–4.5)
VLDLC SERPL CALC-MCNC: 22 MG/DL (ref 5–40)
WBC # BLD AUTO: 3.9 X10E3/UL (ref 3.4–10.8)

## 2022-04-06 ENCOUNTER — OFFICE VISIT (OUTPATIENT)
Dept: INTERNAL MEDICINE | Facility: CLINIC | Age: 67
End: 2022-04-06

## 2022-04-06 VITALS
DIASTOLIC BLOOD PRESSURE: 70 MMHG | TEMPERATURE: 97.3 F | HEART RATE: 74 BPM | BODY MASS INDEX: 30.76 KG/M2 | SYSTOLIC BLOOD PRESSURE: 128 MMHG | WEIGHT: 196 LBS | HEIGHT: 67 IN

## 2022-04-06 DIAGNOSIS — E03.8 HYPOTHYROIDISM DUE TO HASHIMOTO'S THYROIDITIS: ICD-10-CM

## 2022-04-06 DIAGNOSIS — C50.111 MALIGNANT NEOPLASM OF CENTRAL PORTION OF RIGHT BREAST IN FEMALE, ESTROGEN RECEPTOR POSITIVE: Primary | ICD-10-CM

## 2022-04-06 DIAGNOSIS — Z17.0 MALIGNANT NEOPLASM OF CENTRAL PORTION OF RIGHT BREAST IN FEMALE, ESTROGEN RECEPTOR POSITIVE: Primary | ICD-10-CM

## 2022-04-06 DIAGNOSIS — I82.401 ACUTE EMBOLISM AND THROMBOSIS OF UNSPECIFIED DEEP VEINS OF RIGHT LOWER EXTREMITY: ICD-10-CM

## 2022-04-06 DIAGNOSIS — I10 ESSENTIAL HYPERTENSION: ICD-10-CM

## 2022-04-06 DIAGNOSIS — Z00.00 MEDICARE ANNUAL WELLNESS VISIT, SUBSEQUENT: ICD-10-CM

## 2022-04-06 DIAGNOSIS — E06.3 HYPOTHYROIDISM DUE TO HASHIMOTO'S THYROIDITIS: ICD-10-CM

## 2022-04-06 DIAGNOSIS — M81.0 AGE-RELATED OSTEOPOROSIS WITHOUT CURRENT PATHOLOGICAL FRACTURE: ICD-10-CM

## 2022-04-06 PROBLEM — D12.6 TUBULAR ADENOMA OF COLON: Status: ACTIVE | Noted: 2022-02-08

## 2022-04-06 PROBLEM — Z90.710 S/P TAH-BSO (TOTAL ABDOMINAL HYSTERECTOMY AND BILATERAL SALPINGO-OOPHORECTOMY): Status: ACTIVE | Noted: 2021-10-12

## 2022-04-06 PROBLEM — Z98.84 HISTORY OF ROUX-EN-Y GASTRIC BYPASS: Status: ACTIVE | Noted: 2021-10-12

## 2022-04-06 PROBLEM — Z90.79 S/P TAH-BSO (TOTAL ABDOMINAL HYSTERECTOMY AND BILATERAL SALPINGO-OOPHORECTOMY): Status: ACTIVE | Noted: 2021-10-12

## 2022-04-06 PROBLEM — Z90.722 S/P TAH-BSO (TOTAL ABDOMINAL HYSTERECTOMY AND BILATERAL SALPINGO-OOPHORECTOMY): Status: ACTIVE | Noted: 2021-10-12

## 2022-04-06 PROCEDURE — G0439 PPPS, SUBSEQ VISIT: HCPCS | Performed by: INTERNAL MEDICINE

## 2022-04-06 PROCEDURE — 1159F MED LIST DOCD IN RCRD: CPT | Performed by: INTERNAL MEDICINE

## 2022-04-06 RX ORDER — MELATONIN
1000 DAILY
COMMUNITY

## 2022-04-06 RX ORDER — LOSARTAN POTASSIUM 50 MG/1
50 TABLET ORAL DAILY
COMMUNITY
End: 2022-04-07 | Stop reason: SDUPTHER

## 2022-04-06 RX ORDER — IBANDRONATE SODIUM 150 MG/1
150 TABLET, FILM COATED ORAL
COMMUNITY
Start: 2022-02-17

## 2022-04-06 RX ORDER — ANASTROZOLE 1 MG/1
TABLET ORAL
COMMUNITY
Start: 2022-02-28
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
## 2022-06-09 DIAGNOSIS — E06.3 HYPOTHYROIDISM DUE TO HASHIMOTO'S THYROIDITIS: ICD-10-CM

## 2022-06-09 DIAGNOSIS — E03.8 HYPOTHYROIDISM DUE TO HASHIMOTO'S THYROIDITIS: ICD-10-CM

## 2022-06-09 DIAGNOSIS — I10 ESSENTIAL HYPERTENSION: ICD-10-CM

## 2022-06-09 RX ORDER — LOSARTAN POTASSIUM 50 MG/1
50 TABLET ORAL DAILY
Qty: 90 TABLET | Refills: 1 | Status: SHIPPED | OUTPATIENT
Start: 2022-06-09 | End: 2022-11-21

## 2022-06-09 RX ORDER — LEVOTHYROXINE SODIUM 88 UG/1
88 TABLET ORAL DAILY
Qty: 90 TABLET | Refills: 1 | Status: SHIPPED | OUTPATIENT
Start: 2022-06-09 | End: 2022-11-21

## 2022-06-09 NOTE — TELEPHONE ENCOUNTER
Caller: Nicolette Kenna    Relationship: Self    Best call back number: [phone]    Requested Prescriptions:   Requested Prescriptions     Pending Prescriptions Disp Refills   • levothyroxine (SYNTHROID, LEVOTHROID) 88 MCG tablet 90 tablet 0     Sig: Take 1 tablet by mouth Daily.   • losartan (COZAAR) 50 MG tablet 90 tablet 3     Sig: Take 1 tablet by mouth Daily.        [facility]arm[facility] where [facility] be sent[place] [address] [address] [facility] [address] [facility] [phone] [facility] [phone] FX     Additional details provided by patient: PATIENT IS OUT OF THIS MEDICATION AND IS NEEDING ADDITIONAL REFILLS SENT OVER TO THE PHARMACY WITH THIS REQUEST    Does the patient have less than a 3 day supply:  [x] Yes  [] No    Obdulio Alegria Rep   06/09/22 10:01 EDT

## 2022-11-19 DIAGNOSIS — I10 ESSENTIAL HYPERTENSION: ICD-10-CM

## 2022-11-19 DIAGNOSIS — E06.3 HYPOTHYROIDISM DUE TO HASHIMOTO'S THYROIDITIS: ICD-10-CM

## 2022-11-19 DIAGNOSIS — E03.8 HYPOTHYROIDISM DUE TO HASHIMOTO'S THYROIDITIS: ICD-10-CM

## 2022-11-21 ENCOUNTER — TELEPHONE (OUTPATIENT)
Dept: INTERNAL MEDICINE | Facility: CLINIC | Age: 67
End: 2022-11-21

## 2022-11-21 DIAGNOSIS — I10 ESSENTIAL HYPERTENSION: ICD-10-CM

## 2022-11-21 DIAGNOSIS — E06.3 HYPOTHYROIDISM DUE TO HASHIMOTO'S THYROIDITIS: ICD-10-CM

## 2022-11-21 DIAGNOSIS — E03.8 HYPOTHYROIDISM DUE TO HASHIMOTO'S THYROIDITIS: ICD-10-CM

## 2022-11-21 RX ORDER — LOSARTAN POTASSIUM 50 MG/1
TABLET ORAL
Qty: 90 TABLET | Refills: 1 | Status: SHIPPED | OUTPATIENT
Start: 2022-11-21 | End: 2022-11-21 | Stop reason: SDUPTHER

## 2022-11-21 RX ORDER — LOSARTAN POTASSIUM 50 MG/1
50 TABLET ORAL DAILY
Qty: 90 TABLET | Refills: 1 | Status: SHIPPED | OUTPATIENT
Start: 2022-11-21

## 2022-11-21 RX ORDER — LEVOTHYROXINE SODIUM 88 UG/1
88 TABLET ORAL DAILY
Qty: 90 TABLET | Refills: 1 | Status: SHIPPED | OUTPATIENT
Start: 2022-11-21

## 2022-11-21 RX ORDER — LEVOTHYROXINE SODIUM 88 UG/1
TABLET ORAL
Qty: 90 TABLET | Refills: 1 | Status: SHIPPED | OUTPATIENT
Start: 2022-11-21 | End: 2022-11-21 | Stop reason: SDUPTHER

## 2022-11-21 NOTE — TELEPHONE ENCOUNTER
Incoming Refill Request      Medication requested (name and dose):   levothyroxine (SYNTHROID, LEVOTHROID) 88 MCG tablet   1 ordered     losartan (COZAAR) 50 MG tablet   1 ordered         Pharmacy where request should be sent: [facility] [id number] [place], [place] - [facility] [address] AT [facility] [address] [address] - [phone]  - [phone] FX  [phone]    Additional details provided by patient: REQUESTED 90 DAY REFILL WITH A REFILL      SHE SAID SHE SAW HER ONCOLOGIST LAST WEEK AND EVERYTHING LOOKED GOOD    Best call back number: [phone]    Does the patient have less than a 3 day supply:  [x] Yes  [] No    Obdulio Ruiz Rep  11/21/22, 13:54 EST

## 2023-01-19 ENCOUNTER — TELEPHONE (OUTPATIENT)
Dept: INTERNAL MEDICINE | Facility: CLINIC | Age: 68
End: 2023-01-19

## 2023-07-26 DIAGNOSIS — I10 ESSENTIAL HYPERTENSION: ICD-10-CM

## 2023-07-26 DIAGNOSIS — E03.8 HYPOTHYROIDISM DUE TO HASHIMOTO'S THYROIDITIS: Primary | ICD-10-CM

## 2023-07-26 DIAGNOSIS — E06.3 HYPOTHYROIDISM DUE TO HASHIMOTO'S THYROIDITIS: Primary | ICD-10-CM

## 2023-07-26 DIAGNOSIS — Z00.00 MEDICARE ANNUAL WELLNESS VISIT, SUBSEQUENT: ICD-10-CM

## 2023-07-28 LAB
ALBUMIN SERPL-MCNC: 4.1 G/DL (ref 3.5–5.2)
ALBUMIN/GLOB SERPL: 1.5 G/DL
ALP SERPL-CCNC: 70 U/L (ref 39–117)
ALT SERPL-CCNC: 14 U/L (ref 1–33)
AST SERPL-CCNC: 16 U/L (ref 1–32)
BASOPHILS # BLD AUTO: 0.02 10*3/MM3 (ref 0–0.2)
BASOPHILS NFR BLD AUTO: 0.5 % (ref 0–1.5)
BILIRUB SERPL-MCNC: 0.5 MG/DL (ref 0–1.2)
BUN SERPL-MCNC: 20 MG/DL (ref 8–23)
BUN/CREAT SERPL: 21.5 (ref 7–25)
CALCIUM SERPL-MCNC: 9.8 MG/DL (ref 8.6–10.5)
CHLORIDE SERPL-SCNC: 108 MMOL/L (ref 98–107)
CHOLEST SERPL-MCNC: 170 MG/DL (ref 0–200)
CO2 SERPL-SCNC: 23.8 MMOL/L (ref 22–29)
CREAT SERPL-MCNC: 0.93 MG/DL (ref 0.57–1)
EGFRCR SERPLBLD CKD-EPI 2021: 67.1 ML/MIN/1.73
EOSINOPHIL # BLD AUTO: 0.18 10*3/MM3 (ref 0–0.4)
EOSINOPHIL NFR BLD AUTO: 4.1 % (ref 0.3–6.2)
ERYTHROCYTE [DISTWIDTH] IN BLOOD BY AUTOMATED COUNT: 13.6 % (ref 12.3–15.4)
GLOBULIN SER CALC-MCNC: 2.7 GM/DL
GLUCOSE SERPL-MCNC: 94 MG/DL (ref 65–99)
HCT VFR BLD AUTO: 39.4 % (ref 34–46.6)
HDLC SERPL-MCNC: 43 MG/DL (ref 40–60)
HGB BLD-MCNC: 13.3 G/DL (ref 12–15.9)
IMM GRANULOCYTES # BLD AUTO: 0 10*3/MM3 (ref 0–0.05)
IMM GRANULOCYTES NFR BLD AUTO: 0 % (ref 0–0.5)
LDLC SERPL CALC-MCNC: 109 MG/DL (ref 0–100)
LYMPHOCYTES # BLD AUTO: 1.79 10*3/MM3 (ref 0.7–3.1)
LYMPHOCYTES NFR BLD AUTO: 40.4 % (ref 19.6–45.3)
MCH RBC QN AUTO: 31.4 PG (ref 26.6–33)
MCHC RBC AUTO-ENTMCNC: 33.8 G/DL (ref 31.5–35.7)
MCV RBC AUTO: 93.1 FL (ref 79–97)
MONOCYTES # BLD AUTO: 0.48 10*3/MM3 (ref 0.1–0.9)
MONOCYTES NFR BLD AUTO: 10.8 % (ref 5–12)
NEUTROPHILS # BLD AUTO: 1.96 10*3/MM3 (ref 1.7–7)
NEUTROPHILS NFR BLD AUTO: 44.2 % (ref 42.7–76)
NRBC BLD AUTO-RTO: 0 /100 WBC (ref 0–0.2)
PLATELET # BLD AUTO: 301 10*3/MM3 (ref 140–450)
POTASSIUM SERPL-SCNC: 4.4 MMOL/L (ref 3.5–5.2)
PROT SERPL-MCNC: 6.8 G/DL (ref 6–8.5)
RBC # BLD AUTO: 4.23 10*6/MM3 (ref 3.77–5.28)
SODIUM SERPL-SCNC: 143 MMOL/L (ref 136–145)
TRIGL SERPL-MCNC: 96 MG/DL (ref 0–150)
TSH SERPL DL<=0.005 MIU/L-ACNC: 0.45 UIU/ML (ref 0.27–4.2)
VLDLC SERPL CALC-MCNC: 18 MG/DL (ref 5–40)
WBC # BLD AUTO: 4.43 10*3/MM3 (ref 3.4–10.8)

## 2023-08-02 ENCOUNTER — OFFICE VISIT (OUTPATIENT)
Dept: INTERNAL MEDICINE | Facility: CLINIC | Age: 68
End: 2023-08-02
Payer: MEDICARE

## 2023-08-02 VITALS
HEART RATE: 74 BPM | SYSTOLIC BLOOD PRESSURE: 132 MMHG | DIASTOLIC BLOOD PRESSURE: 82 MMHG | BODY MASS INDEX: 29.19 KG/M2 | WEIGHT: 186 LBS | HEIGHT: 67 IN

## 2023-08-02 DIAGNOSIS — Z17.0 MALIGNANT NEOPLASM OF CENTRAL PORTION OF RIGHT BREAST IN FEMALE, ESTROGEN RECEPTOR POSITIVE: ICD-10-CM

## 2023-08-02 DIAGNOSIS — C50.111 MALIGNANT NEOPLASM OF CENTRAL PORTION OF RIGHT BREAST IN FEMALE, ESTROGEN RECEPTOR POSITIVE: ICD-10-CM

## 2023-08-02 DIAGNOSIS — Z00.00 MEDICARE ANNUAL WELLNESS VISIT, SUBSEQUENT: ICD-10-CM

## 2023-08-02 DIAGNOSIS — I10 ESSENTIAL HYPERTENSION: Primary | ICD-10-CM

## 2023-08-02 DIAGNOSIS — E03.8 HYPOTHYROIDISM DUE TO HASHIMOTO'S THYROIDITIS: ICD-10-CM

## 2023-08-02 DIAGNOSIS — E06.3 HYPOTHYROIDISM DUE TO HASHIMOTO'S THYROIDITIS: ICD-10-CM

## 2023-08-02 RX ORDER — LOSARTAN POTASSIUM 50 MG/1
50 TABLET ORAL DAILY
Qty: 90 TABLET | Refills: 4 | Status: SHIPPED | OUTPATIENT
Start: 2023-08-02

## 2023-08-02 RX ORDER — LEVOTHYROXINE SODIUM 88 UG/1
88 TABLET ORAL DAILY
Qty: 90 TABLET | Refills: 4 | Status: SHIPPED | OUTPATIENT
Start: 2023-08-02

## 2023-09-26 ENCOUNTER — OFFICE VISIT (OUTPATIENT)
Dept: INTERNAL MEDICINE | Facility: CLINIC | Age: 68
End: 2023-09-26
Payer: MEDICARE

## 2023-09-26 VITALS — WEIGHT: 189 LBS | BODY MASS INDEX: 29.66 KG/M2 | HEIGHT: 67 IN

## 2023-09-26 DIAGNOSIS — M65.4 DE QUERVAIN'S TENOSYNOVITIS, LEFT: Primary | ICD-10-CM

## 2023-09-26 PROCEDURE — 99212 OFFICE O/P EST SF 10 MIN: CPT | Performed by: INTERNAL MEDICINE

## 2023-09-26 NOTE — PROGRESS NOTES
"Chief Complaint  Wrist Pain (left)    Subjective        Kenna Will presents to [facility]  History of Present Illness L wrist pain for about 6 weeks. Some better if she wears a splint at night- gets bad again during the day.  Pain worse with trying to hold things.   No trauma.     Objective   Vital Signs:  Ht 170.2 cm (67.01\")   Wt 85.7 kg (189 lb)   BMI 29.59 kg/m²   Estimated body mass index is 29.59 kg/m² as calculated from the following:    Height as of this encounter: 170.2 cm (67.01\").    Weight as of this encounter: 85.7 kg (189 lb).               Physical Exam  Musculoskeletal:      Left hand: No swelling. Tenderness: base of thumb.Normal pulse.      Result Review :                   Assessment and Plan   Diagnoses and all orders for this visit:    1. De Quervain's tenosynovitis, left (Primary)  Comments:  would like to see ortho/hand- recommend wearing splint continuously for an extended period.  Orders:  -     Ambulatory Referral to Hand Surgery             Follow Up   No follow-ups on file.  Patient was given instructions and counseling regarding her condition or for health maintenance advice. Please see specific information pulled into the AVS if appropriate.         "

## 2023-11-13 DIAGNOSIS — E06.3 HYPOTHYROIDISM DUE TO HASHIMOTO'S THYROIDITIS: ICD-10-CM

## 2023-11-13 DIAGNOSIS — I10 ESSENTIAL HYPERTENSION: ICD-10-CM

## 2023-11-13 DIAGNOSIS — E03.8 HYPOTHYROIDISM DUE TO HASHIMOTO'S THYROIDITIS: ICD-10-CM

## 2023-11-13 RX ORDER — LOSARTAN POTASSIUM 50 MG/1
50 TABLET ORAL DAILY
Qty: 90 TABLET | Refills: 4 | Status: SHIPPED | OUTPATIENT
Start: 2023-11-13

## 2023-11-13 RX ORDER — LEVOTHYROXINE SODIUM 88 UG/1
88 TABLET ORAL DAILY
Qty: 90 TABLET | Refills: 4 | Status: SHIPPED | OUTPATIENT
Start: 2023-11-13

## 2024-05-13 DIAGNOSIS — Z17.0 MALIGNANT NEOPLASM OF CENTRAL PORTION OF RIGHT BREAST IN FEMALE, ESTROGEN RECEPTOR POSITIVE: Primary | ICD-10-CM

## 2024-05-13 DIAGNOSIS — C50.111 MALIGNANT NEOPLASM OF CENTRAL PORTION OF RIGHT BREAST IN FEMALE, ESTROGEN RECEPTOR POSITIVE: Primary | ICD-10-CM

## 2024-05-16 NOTE — PROGRESS NOTES
Follow-up (New PT Oncology)      REASON FOR CONSULTATION:  Breast Cancer    REQUESTING PHYSICIAN: Landy Alberts MD  RECORDS OBTAINED:  Records of the patients history including those from the electronic medical record were reviewed and summarized in detail.    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    To review her right breast invasive ductal carcinoma history, she presented to her primary care for an annual physical and was noted to have an area of abnormality in her right breast.  Diagnostic bilateral mammogram as well as right breast ultrasound showed 2 adjacent irregular areas at 10:00.  The right breast 10:00 region, 13 cm from nipple was biopsied, revealing invasive ductal carcinoma (ER/AK positive, HER2 negative).  She was also noted to have 3 irregular appearing right axillary lymph nodes of the largest lymph node was positive for metastatic carcinoma.  Left axilla was also noted to have questionable possible abnormal lymph nodes.    Further evaluation with a PET scan done on 4/29/2021 showed multiple right axillary intensely hypermetabolic lymph nodes consistent with biopsy-proven stephanie metastases, a solitary mildly active nonenlarged left axillary lymph node which was nonspecific; hypermetabolic upper outer quadrant right breast malignant disease; renal calculi and no distant sites of metastases    Breast MRI done on 4/29/2021 showed right breast with 2.4 cm mass at 10:00 highly suspicious for malignancy and compatible with newly diagnosed carcinoma with evidence of right axillary lymphadenopathy and multiple abnormal appearing axillary lymph nodes.  No MRI evidence of malignancy in left breast.  However, an 18 mm left axillary lymph node was considered abnormal but possibly related to prior COVID vaccination.   She then underwent neoadjuvant chemotherapy at [facility] (Dr. Ryan).  She completed 12 cycles of weekly Taxol on 8/2/2021 followed by 4 cycles of AC on 10/4/2021    She also developed a right soleal acute DVT in October 2021, and was treated with Eliquis    MRI breast as well as mammogram done in October 2021 after completion of neoadjuvant chemotherapy showed improvement in the appearance of primary tumor as well as right axillary lymph node.  No other abnormal right axilla lymph node seen.    On 11/9/2021 she underwent right JYOTI localized segmental mastectomy with sentinel lymph node biopsy.  Pathology confirmed multiple foci of residual carcinoma within tumor bed.  Largest foci was 3 mm, intermediate grade.  DCIS was present.  Margins negative.  3 sentinel lymph node biopsied, 2 nodes contained ITCs only.  No macro or micrometastases.  (ypT1a N0).  She then underwent completion right axillary lymph node dissection on 11/23/2021.  Pathology revealed 3/14 lymph nodes with isolated tumor cells.    Postop course was complicated by right axillary abscess requiring I&D on 1/7/2022.  In May 2022, she was started on cephalexin p.o. for erythematous right breast skin changes with induration.  She underwent I&D of right breast abscess on 6/2/2022, and completed a course of Bactrim.    She completed radiation on 2/17/2022.  This was complicated by skin blisters.  She was then started on Arimidex.    In August 2023, clinical breast exam was notable for a palpable abnormality in right breast at 10:00-11 o'clock position.  Directed ultrasound showed mixed echogenic mass measuring 5.2 x 2.1 x 3.1 cm with no internal color flow or posterior findings.  Felt to be suggestive of a prominent surgical seroma or fat necrosis.  Short-term follow-up ultrasound was suggested if clinically warranted (BI-RADS 3).  A right diagnostic mammogram on 11/15/2023 revealed amorphous and indistinct calcification within lumpectomy  site which was felt to be benign.  Follow-up bilateral diagnostic mammogram recommended in 6 months (BI-RADS 3)    She underwent CT chest without contrast as well as bilateral diagnostic mammogram on 5/8/2024.  Mammogram revealed probably benign calcifications in the lumpectomy bed.  BI-RADS 3.  CT chest without contrast revealed a 2.7 x 2 cm nodule, previously measuring 2.2 x 2.1 cm without change to slightly increase in size.  Focal area of fat necrosis within the right breast associated with surgical clips without change.    Today, she is here to establish care with me.  She recently saw Dr. Felder at [facility] on 10 May 2024 for concerns of uptrending tumor markers as well as osteopenia.  I do not have the notes for my review.  However, per patient, they discussed starting her on Zometa 4 mg every 6 months.  Patient requesting to get her first dose here with us, and then she will be subsequently followed in [place].  She has a repeat mammogram scheduled in [place] in 6 months.  She prefers to see us annually while she continues to see [facility] every 6 months.  She states compliance with Arimidex.  Her only issues are hot flashes, mostly at night.  She is not interested in any pharmaceutical therapies for hot flashes.  She reports she had some neuropathy with chemotherapy, however, it is now resolved.  She denies any other concerns or complaints.  She plans to return to [place] in August for a 3-month visit with Dr. Felder            Past Medical History:   Diagnosis Date    Benign essential hypertension     Hypothyroidism     Iron deficiency anemia     Malignant neoplasm of central portion of right breast in female, estrogen receptor positive 5/7/2021    Rosacea     Vitamin B12 deficiency     Vitamin D deficiency      Past Surgical History:   Procedure Laterality Date    BREAST BIOPSY      Benign    COLONOSCOPY  2017    [place] - Q5    DIAGNOSTIC LAPAROSCOPY      GASTRIC BYPASS       HYSTERECTOMY      LIPOMA EXCISION      Middle forehead (excised x2)   Benign    OOPHORECTOMY      US GUIDED LYMPH NODE BIOPSY  4/20/2021       MEDICATIONS    Current Outpatient Medications:     anastrozole (ARIMIDEX) 1 MG tablet, , Disp: , Rfl:     ibandronate (BONIVA) 150 MG tablet, Take 1 tablet by mouth Every 30 (Thirty) Days., Disp: , Rfl:     levothyroxine (SYNTHROID, LEVOTHROID) 88 MCG tablet, TAKE ONE TABLET BY MOUTH DAILY, Disp: 90 tablet, Rfl: 4    losartan (COZAAR) 50 MG tablet, TAKE ONE TABLET BY MOUTH DAILY, Disp: 90 tablet, Rfl: 4    Multiple Vitamins-Minerals (MULTI COMPLETE PO), Take  by mouth., Disp: , Rfl:     Calcium Carbonate-Vit D-Min (CALCIUM 1200 PO), Take  by mouth. (Patient not taking: Reported on 5/20/2024), Disp: , Rfl:     cholecalciferol (VITAMIN D3) 25 MCG (1000 UT) tablet, Take 1 tablet by mouth Daily. (Patient not taking: Reported on 5/20/2024), Disp: , Rfl:     ALLERGIES:     Allergies   Allergen Reactions    Oxycodone-Acetaminophen Other (See Comments)     HALLUCINATIONS       SOCIAL HISTORY:       Social History     Socioeconomic History    Marital status: Single   Tobacco Use    Smoking status: Never    Smokeless tobacco: Never   Substance and Sexual Activity    Alcohol use: Never    Drug use: Never    Sexual activity: Not Currently         FAMILY HISTORY:  Family History   Problem Relation Age of Onset    Hypertension Mother     Obesity Mother     Heart disease Father     Hypertension Father     Dementia Maternal Grandmother     Lung cancer Maternal Grandfather     Hypertension Other     Breast cancer Sister        REVIEW OF SYSTEMS:  Review of Systems   Constitutional: Negative.         Hot flashes, mostly at night   Respiratory: Negative.     Cardiovascular: Negative.    Gastrointestinal: Negative.    Endocrine: Negative.    Skin: Negative.         Radiation-induced skin changes in right breast   Neurological: Negative.    Hematological: Negative.               Vitals:     "05/20/24 0816   BP: 133/82   Pulse: 73   Resp: 16   Temp: 97.7 °F (36.5 °C)   TempSrc: Oral   SpO2: 100%   Weight: 92 kg (202 lb 12.8 oz)   Height: 170.2 cm (67.01\")   PainSc: 0-No pain         5/20/2024     8:18 AM   Current Status   ECOG score 0      PHYSICAL EXAM:    CONSTITUTIONAL:  Vital signs reviewed.  No distress, looks comfortable.  EYES:  Conjunctiva and lids unremarkable.  PERRLA  RESPIRATORY:  Normal respiratory effort.  Lungs clear to auscultation bilaterally.  Breast: Hyperpigmentation noted in inferior right breast.  Well-healed surgical scars noted.  Right axilla with firm surgical scar.  No nipple discharge.  Left breast without any masses skin changes or nipple discharge  GASTROINTESTINAL: Abdomen appears unremarkable.  Nontender.  No hepatomegaly.  No splenomegaly.  LYMPHATIC:  No cervical, supraclavicular, axillary lymphadenopathy.  NEURO:  No focal deficits.  AOx4  MUSCULOSKELETAL:  Unremarkable digits/nails.  No cyanosis or clubbing.  No apparent joint deformities.  SKIN:  Warm.  No rashes.  PSYCHIATRIC:  Normal judgment and insight.  Normal mood and affect.     RECENT LABS:        WBC   Date Value Ref Range Status   07/28/2023 4.43 3.40 - 10.80 10*3/mm3 Final   03/30/2022 3.9 3.4 - 10.8 x10E3/uL Final   03/25/2021 5.62 3.40 - 10.80 10*3/mm3 Final   01/02/2020 6.54 3.40 - 10.80 10*3/mm3 Final     Hemoglobin   Date Value Ref Range Status   07/28/2023 13.3 12.0 - 15.9 g/dL Final   03/30/2022 11.2 11.1 - 15.9 g/dL Final   03/25/2021 13.3 12.0 - 15.9 g/dL Final   01/02/2020 12.0 12.0 - 15.9 g/dL Final     Platelets   Date Value Ref Range Status   07/28/2023 301 140 - 450 10*3/mm3 Final   03/30/2022 377 150 - 450 x10E3/uL Final   03/25/2021 383 140 - 450 10*3/mm3 Final   01/02/2020 425 140 - 450 10*3/mm3 Final     Pathology  4/5/2021  1. Right Breast, 10:00, 13 cm FN, U/S-Guided Core Needle Biopsy for a Mass:               A. INVASIVE DUCTAL CARCINOMA, Poorly differentiated; [place] Histologic " Grade III/III (tubule  score = 3, nuclear score = 3, mitoses score = 2), measuring at least 12 mm.               B. FOCAL HIGH GRADE DUCTAL CARCINOMA IN SITU (DCIS), Solid type with single cell necrosis.               C. Positive for lymphovascular space invasion.                D. See Comment and Biomarker Template #1.     2. Lymph Node, Right Axilla, U/S-Guided Core Needle Biopsy:               A. METASTATIC MAMMARY CARCINOMA.               B. Metastatic focus measures 6 mm.               C. No definitive extranodal extension is present.               D. See Comment and Biomarker Template #2.    Imaging  4/5/2021 diagnostic bilateral mammogram  IMPRESSION:  1. There are 2 adjacent irregular lesions at the 10 o'clock axis in the right breast on the order of 12 cm and 13 cm from the nipple that measure on the order of 0.5 cm and 0.8 cm in greatest dimension.  Additionally, there are linear areas of increased density seen mammographically in the middle and posterior one-thirds of the upper outer quadrant of the right breast in conjunction with abnormal-appearing right axillary lymph nodes. Ultrasound-guided biopsy of the lesion at the 10 o'clock position on the order of 13 cm from the nipple as well as the dominant abnormal-appearing right axillary lymph node is recommended.  2. There are no findings suspicious for malignancy in the left breast.     BI-RADS CATEGORY 4C: Suspicious abnormality. Biopsy should be  considered.    4/29/2021 MRI breast, bilateral  IMPRESSION:   1. Left breast: No MRI evidence of malignancy. An 18 mm left axillary lymph node   is demonstrated, considered abnormal but possibly related to prior Covid   vaccination.     2. Right breast: 2.4 x 2.3 cm indistinct irregular enhancing mass centered at   the 10:00 position, highly suspicious for malignancy and compatible with the   reported newly diagnosed carcinoma. There is evidence of post needle biopsy   changes but no separate MRI evidence of  malignancy is identified. There is also   evidence of right axillary lymphadenopathy, with multiple abnormal appearing   axillary nodes, as above.     4/29/2021 PET scan  Impression    1. Multiple right axillary nodes are intensely hypermetabolic, consistent with presence of biopsy proven stephanie metastases from breast malignancy. A solitary mildly active nonenlarged left axillary node is nonspecific.  2. Upper outer right breast malignant disease is moderately hypermetabolic  3. Renal calculi  4. There are no distant sites of hypermetabolism to indicate metastasis elsewhere    10/5/2021 bilateral lower extremity duplex    RIGHT: Abnormal: There is acute thrombus in the soleal vein.    There is acute thrombus in the soleal vein. The remainder of the deep and superficial veins of the right lower extremity is patent and without thrombus. There is no reflux in the deep veins in the supine position.    LEFT: Normal.    The deep and superficial veins of the left lower extremity are patent and without thrombus. There is no reflux in the deep veins in the supine position.    COMPARISON: There are no exams for comparison.    10/7/2021 MRI bilateral breast  Right Breast:  No abnormal enhancement remaining in the region of the biopsy-proven neoplasm. Axillary lymph nodes have returned to normal.  Left Breast: No MRI features of malignancy. Dermal lesion lower outer quadrant which was not present on prior mammograms nor CT and likely reflects benign dermal lesion or inflammatory process.     10/13/2021 bilateral diagnostic mammogram  1. Improvement in the appearance and size of the primary tumor and the right axillary lymph node which had been biopsied. No other abnormal right axillary lymph nodes were seen on the current ultrasound.   2. The calcific-like foci projecting over both breasts should be followed with bilateral mammography in 6 months.   3. Findings were discussed with Antonio Siddiqui M.D. on 10/13/2021 11:34 AM.  We went over the images together. A summary sheet will be provided to the patient.     BI-RADS: 6 - Known Biopsy-Proven Malignancy   RECOMMENDATION: Known Cancer     5/8/2024 DEXA scan  Diagnostic classification using World Health Organization (WHO) criteria:   There is osteopenia of bilateral femoral neck with T score -2.4   No significant change compared to 2022   Consider ordering bone density with extremities for future follow-up. The lumbar region is not suitable for evaluation     5/8/2024 CT chest without contrast  FINDINGS:   TUBES AND IMPLANTS: There are no tubes or implants noted.     LUNG AND AIRWAYS: The lungs are clear. There are no nodules or infiltrates.   PLEURA: There are no pleural effusions.   AORTA: Within normal limits.   CORONARY ARTERY: No coronary artery calcifications seen.   PULMONARY ARTERIES: Normal appearance.   HEART: The heart is normal in size.   MEDIASTINUM AND VEE:  There are no enlarged hilar or mediastinal lymph nodes.   CHEST WALL AND LOWER NECK:   Nodule 2.7 x 2 cm in diameter previously 2.2 x 2.1 cm without change to slightly increase in size. Focal area of fat necrosis seen within the right breast associated with surgical clips without change.   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   BONES: No suspicious osseous lesions.     1.  Postoperative changes status post Rosaline-en-Y gastric bypass.   2.  No change to slight increase in size of right axillary lymph node/nodule.   3.  Postoperative changes of the right breast.     5/8/2024 bilateral diagnostic mammogram  Probable benign as above. Six-month follow-up right mammogram recommended    BI-RADS: 3 - Probably Benign  RECOMMENDATION: Follow up in 6 months    Reviewed and Interpreted by: Wendi Contreras M.D.  5/8/2024 8:40 AM  Narrative    This result has an attachment that is not available.      MAMMOGRAM DIAGNOSTIC BILATERAL W 3D TOMOSYNTHESIS (DBT) 5/8/2024 8:19 AM    CLINICAL INDICATION: The  patient is here for short-term follow-up calcifications followed since November 2023. History of right lumpectomy with radiation therapy November 2021. Family history breast cancer sister in her 50s    COMPARISON: Comparison is made with available prior studies dating back to 4/6/2021    TECHNIQUE: Bilateral digital 3-D Tomosynthesis images were obtained including 2D C-view images along with utilization of CAD.    FINDINGS:  The breast tissue displays scattered areas of fibroglandular density.  There are postlumpectomy changes upper outer posterior right breast. Magnified views were performed in the area of probable benign calcifications in the lumpectomy bed. The calcifications appear stable and are felt most likely related to developing fat necrosis. There is no new dominant mass, architectural distortion, on the right. The left breast is unremarkable and unchanged      Assessment & Plan     *Right breast invasive ductal carcinoma with right axillary lymph node metastases, ER/LA positive, HER2 negative  -Diagnosed in April 2021-right breast abnormality noted on annual physical exam by primary care.    -April 2021 bilateral diagnostic mammogram: There are 2 adjacent irregular lesions at 10:00 axis, 12 and 13 cm from the nipple, measuring 0.5 and 0.8 cm.  Abnormal appearing right axillary lymph nodes.  -April 2021 ultrasound-guided right breast lesion 10 o'clock position, about 13 cm from the nipple; and right axillary lymph node biopsy-invasive ductal carcinoma, poorly differentiated.  Grade 3.  Focal high-grade DCIS solid type with single cell necrosis.  Positive for LVI.    -4/29/2021 MRI bilateral breast: 2.4 x 2.3 cm indistinct irregular enhancing mass at 10 o'clock position and right breast.  No MRI evidence of malignancy in left breast.  An 18 mm left axillary lymph node demonstrated, considered abnormal, possibly related to prior COVID vaccination.  -4/29/2021 PET scan: Multiple right axillary lymph nodes  intensely Hypermetabolic.  Solitary mildly active nonenlarged left axillary lymph node, nonspecific.  Upper outer right breast malignant disease moderately hypermetabolic.  No evidence of distant mets  -October 2021: S/p neoadjuvant chemotherapy at [facility] (Dr. Ryan).  She completed 12 cycles of weekly Taxol on 8/2/2021 followed by 4 cycles of AC on 10/4/2021  -October 2021 MRI bilateral breast, and diagnostic bilateral mammogram (post neoadjuvant chemotherapy)-improvement in appearance and size of primary tumor and right axillary lymph node.  -11/9/2021: right JYOTI localized segmental mastectomy with sentinel lymph node biopsy.  Path - multiple foci of residual carcinoma within tumor bed.  Largest foci was 3 mm, intermediate grade.  DCIS was present.  Margins negative.  3 sentinel lymph node biopsied, 2 nodes contained ITCs only.  No macro or micrometastases.  (ypT1a N0)  -11/23/2021 s/p completion right axillary dissection given 15% risk of residual tumor within remaining axillary nodes.  Path-3/14 lymph nodes with isolated tumor cells  -2/17/2022: Completed radiation therapy  - February 2022: Started on Arimidex  -August 2023 right breast diagnostic ultrasound (for evaluation of palpable abnormality on clinical breast exam Dr. Close-5.2 x 2.1 x 3.1 cm mixed echogenic mass with no internal color flow or posterior findings.  BI-RADS 3.  -November 2023 right breast diagnostic mammogram: Amorphous and indistinct calcification within the lumpectomy site.  Follow-up bilateral diagnostic mammogram in 6 months recommended.  BI-RADS 3.  -5/8/2024 bilateral diagnostic mammogram: Postlumpectomy changes upper outer posterior right breast.  Calcifications, probably benign in lumpectomy bed.  BI-RADS 3.  6-month follow-up right mammogram recommended  -5/8/2024 CT chest without contrast: CT chest without contrast revealed a 2.7 x 2 cm nodule, previously measuring 2.2 x 2.1 cm without change to slightly  increase in size.  Focal area of fat necrosis within the right breast associated with surgical clips without change.    *Osteopenia  -5/8/2024 DEXA scan axial-osteopenia of bilateral femoral neck, T-score -2.4.  No significant change compared with 2022.  - Dr. Felder recommended starting Zometa 4 mg IV every 6 months.  - I reviewed the possible side effects of Zometa including but not limited to hypocalcemia, and the rare but possible risk of osteonecrosis of jaw.  I have discussed the need for dental clearance prior to proceeding with Zometa.  Emphasized on maintaining dental hygiene as well as following up with dentist every 6 months while on Zometa.  Patient verbalized understanding and was in agreement.    *History of right soleal vein thrombus  -10/5/2021 bilateral lower extremity venous duplex-acute right soleal vein thrombus.  -Treated with Eliquis for 6 months    *Bilateral inframammary candidiasis  -On miconazole    Plan  -Scheduled for right diagnostic mammogram in November 2024 at [facility]  - Continue Arimidex  -Dental clearance form given to patient.  Once dental clearance has been obtained, she can be scheduled for Zometa.   -Patient prefers.  Of Zometa here, and plans to get subsequent 6 monthly doses at [facility].  -Patient prefers to see us annually, and plans to continue to see [facility] every 3 to 6 months.

## 2024-05-20 ENCOUNTER — CONSULT (OUTPATIENT)
Dept: ONCOLOGY | Facility: CLINIC | Age: 69
End: 2024-05-20
Payer: MEDICARE

## 2024-05-20 VITALS
RESPIRATION RATE: 16 BRPM | TEMPERATURE: 97.7 F | SYSTOLIC BLOOD PRESSURE: 133 MMHG | DIASTOLIC BLOOD PRESSURE: 82 MMHG | OXYGEN SATURATION: 100 % | HEIGHT: 67 IN | WEIGHT: 202.8 LBS | HEART RATE: 73 BPM | BODY MASS INDEX: 31.83 KG/M2

## 2024-05-20 DIAGNOSIS — Z79.811 VISIT FOR MONITORING ARIMIDEX THERAPY: ICD-10-CM

## 2024-05-20 DIAGNOSIS — C50.111 MALIGNANT NEOPLASM OF CENTRAL PORTION OF RIGHT BREAST IN FEMALE, ESTROGEN RECEPTOR POSITIVE: Primary | ICD-10-CM

## 2024-05-20 DIAGNOSIS — M85.80 OSTEOPENIA DUE TO CANCER THERAPY: ICD-10-CM

## 2024-05-20 DIAGNOSIS — Z51.81 VISIT FOR MONITORING ARIMIDEX THERAPY: ICD-10-CM

## 2024-05-20 DIAGNOSIS — Z17.0 MALIGNANT NEOPLASM OF CENTRAL PORTION OF RIGHT BREAST IN FEMALE, ESTROGEN RECEPTOR POSITIVE: Primary | ICD-10-CM

## 2024-05-20 PROCEDURE — 1126F AMNT PAIN NOTED NONE PRSNT: CPT | Performed by: STUDENT IN AN ORGANIZED HEALTH CARE EDUCATION/TRAINING PROGRAM

## 2024-05-20 PROCEDURE — 99204 OFFICE O/P NEW MOD 45 MIN: CPT | Performed by: STUDENT IN AN ORGANIZED HEALTH CARE EDUCATION/TRAINING PROGRAM

## 2024-05-20 PROCEDURE — 3075F SYST BP GE 130 - 139MM HG: CPT | Performed by: STUDENT IN AN ORGANIZED HEALTH CARE EDUCATION/TRAINING PROGRAM

## 2024-05-20 PROCEDURE — 3079F DIAST BP 80-89 MM HG: CPT | Performed by: STUDENT IN AN ORGANIZED HEALTH CARE EDUCATION/TRAINING PROGRAM

## 2024-05-20 RX ORDER — SODIUM CHLORIDE 9 MG/ML
20 INJECTION, SOLUTION INTRAVENOUS ONCE
OUTPATIENT
Start: 2024-05-20

## 2024-05-20 RX ORDER — ZOLEDRONIC ACID 0.04 MG/ML
4 INJECTION, SOLUTION INTRAVENOUS ONCE
OUTPATIENT
Start: 2024-05-20

## 2024-05-21 ENCOUNTER — PATIENT ROUNDING (BHMG ONLY) (OUTPATIENT)
Dept: ONCOLOGY | Facility: CLINIC | Age: 69
End: 2024-05-21
Payer: MEDICARE

## 2024-05-21 NOTE — PROGRESS NOTES
A My-Chart message has been sent to the patient for PATIENT ROUNDING with [facility].

## 2024-05-22 DIAGNOSIS — C50.111 MALIGNANT NEOPLASM OF CENTRAL PORTION OF RIGHT BREAST IN FEMALE, ESTROGEN RECEPTOR POSITIVE: ICD-10-CM

## 2024-05-22 DIAGNOSIS — M81.0 AGE-RELATED OSTEOPOROSIS WITHOUT CURRENT PATHOLOGICAL FRACTURE: ICD-10-CM

## 2024-05-22 DIAGNOSIS — Z17.0 MALIGNANT NEOPLASM OF CENTRAL PORTION OF RIGHT BREAST IN FEMALE, ESTROGEN RECEPTOR POSITIVE: ICD-10-CM

## 2024-05-22 DIAGNOSIS — M85.80 OSTEOPENIA DUE TO CANCER THERAPY: Primary | ICD-10-CM

## 2024-05-31 ENCOUNTER — INFUSION (OUTPATIENT)
Dept: ONCOLOGY | Facility: HOSPITAL | Age: 69
End: 2024-05-31
Payer: MEDICARE

## 2024-05-31 VITALS
OXYGEN SATURATION: 98 % | SYSTOLIC BLOOD PRESSURE: 125 MMHG | WEIGHT: 201.4 LBS | DIASTOLIC BLOOD PRESSURE: 87 MMHG | TEMPERATURE: 97.7 F | HEART RATE: 87 BPM | BODY MASS INDEX: 31.54 KG/M2 | RESPIRATION RATE: 16 BRPM

## 2024-05-31 DIAGNOSIS — C50.111 MALIGNANT NEOPLASM OF CENTRAL PORTION OF RIGHT BREAST IN FEMALE, ESTROGEN RECEPTOR POSITIVE: Primary | ICD-10-CM

## 2024-05-31 DIAGNOSIS — M85.80 OSTEOPENIA DUE TO CANCER THERAPY: ICD-10-CM

## 2024-05-31 DIAGNOSIS — M81.0 AGE-RELATED OSTEOPOROSIS WITHOUT CURRENT PATHOLOGICAL FRACTURE: ICD-10-CM

## 2024-05-31 DIAGNOSIS — Z17.0 MALIGNANT NEOPLASM OF CENTRAL PORTION OF RIGHT BREAST IN FEMALE, ESTROGEN RECEPTOR POSITIVE: Primary | ICD-10-CM

## 2024-05-31 LAB
ALBUMIN SERPL-MCNC: 3.8 G/DL (ref 3.5–5.2)
ALBUMIN/GLOB SERPL: 1.2 G/DL
ALP SERPL-CCNC: 67 U/L (ref 39–117)
ALT SERPL W P-5'-P-CCNC: 17 U/L (ref 1–33)
ANION GAP SERPL CALCULATED.3IONS-SCNC: 11 MMOL/L (ref 5–15)
AST SERPL-CCNC: 23 U/L (ref 1–32)
BASOPHILS # BLD AUTO: 0.01 10*3/MM3 (ref 0–0.2)
BASOPHILS NFR BLD AUTO: 0.2 % (ref 0–1.5)
BILIRUB SERPL-MCNC: 0.4 MG/DL (ref 0–1.2)
BUN SERPL-MCNC: 10 MG/DL (ref 8–23)
BUN/CREAT SERPL: 10 (ref 7–25)
CALCIUM SPEC-SCNC: 9.3 MG/DL (ref 8.6–10.5)
CHLORIDE SERPL-SCNC: 105 MMOL/L (ref 98–107)
CO2 SERPL-SCNC: 25 MMOL/L (ref 22–29)
CREAT SERPL-MCNC: 1 MG/DL (ref 0.57–1)
DEPRECATED RDW RBC AUTO: 47.5 FL (ref 37–54)
EGFRCR SERPLBLD CKD-EPI 2021: 61.1 ML/MIN/1.73
EOSINOPHIL # BLD AUTO: 0.25 10*3/MM3 (ref 0–0.4)
EOSINOPHIL NFR BLD AUTO: 5.5 % (ref 0.3–6.2)
ERYTHROCYTE [DISTWIDTH] IN BLOOD BY AUTOMATED COUNT: 13.7 % (ref 12.3–15.4)
GLOBULIN UR ELPH-MCNC: 3.3 GM/DL
GLUCOSE SERPL-MCNC: 109 MG/DL (ref 65–99)
HCT VFR BLD AUTO: 41.4 % (ref 34–46.6)
HGB BLD-MCNC: 13.8 G/DL (ref 12–15.9)
IMM GRANULOCYTES # BLD AUTO: 0 10*3/MM3 (ref 0–0.05)
IMM GRANULOCYTES NFR BLD AUTO: 0 % (ref 0–0.5)
LYMPHOCYTES # BLD AUTO: 1.47 10*3/MM3 (ref 0.7–3.1)
LYMPHOCYTES NFR BLD AUTO: 32.5 % (ref 19.6–45.3)
MAGNESIUM SERPL-MCNC: 1.8 MG/DL (ref 1.6–2.4)
MCH RBC QN AUTO: 31.4 PG (ref 26.6–33)
MCHC RBC AUTO-ENTMCNC: 33.3 G/DL (ref 31.5–35.7)
MCV RBC AUTO: 94.1 FL (ref 79–97)
MONOCYTES # BLD AUTO: 0.5 10*3/MM3 (ref 0.1–0.9)
MONOCYTES NFR BLD AUTO: 11.1 % (ref 5–12)
NEUTROPHILS NFR BLD AUTO: 2.29 10*3/MM3 (ref 1.7–7)
NEUTROPHILS NFR BLD AUTO: 50.7 % (ref 42.7–76)
NRBC BLD AUTO-RTO: 0 /100 WBC (ref 0–0.2)
PHOSPHATE SERPL-MCNC: 3.6 MG/DL (ref 2.5–4.5)
PLATELET # BLD AUTO: 304 10*3/MM3 (ref 140–450)
PMV BLD AUTO: 9 FL (ref 6–12)
POTASSIUM SERPL-SCNC: 4.4 MMOL/L (ref 3.5–5.2)
PROT SERPL-MCNC: 7.1 G/DL (ref 6–8.5)
RBC # BLD AUTO: 4.4 10*6/MM3 (ref 3.77–5.28)
SODIUM SERPL-SCNC: 141 MMOL/L (ref 136–145)
WBC NRBC COR # BLD AUTO: 4.52 10*3/MM3 (ref 3.4–10.8)

## 2024-05-31 PROCEDURE — 96374 THER/PROPH/DIAG INJ IV PUSH: CPT

## 2024-05-31 PROCEDURE — 85025 COMPLETE CBC W/AUTO DIFF WBC: CPT

## 2024-05-31 PROCEDURE — 25810000003 SODIUM CHLORIDE 0.9 % SOLUTION: Performed by: STUDENT IN AN ORGANIZED HEALTH CARE EDUCATION/TRAINING PROGRAM

## 2024-05-31 PROCEDURE — 84100 ASSAY OF PHOSPHORUS: CPT

## 2024-05-31 PROCEDURE — 25010000002 ZOLEDRONIC ACID 4 MG/100ML SOLUTION: Performed by: STUDENT IN AN ORGANIZED HEALTH CARE EDUCATION/TRAINING PROGRAM

## 2024-05-31 PROCEDURE — 80053 COMPREHEN METABOLIC PANEL: CPT

## 2024-05-31 PROCEDURE — 83735 ASSAY OF MAGNESIUM: CPT

## 2024-05-31 RX ORDER — ZOLEDRONIC ACID 0.04 MG/ML
4 INJECTION, SOLUTION INTRAVENOUS ONCE
Status: COMPLETED | OUTPATIENT
Start: 2024-05-31 | End: 2024-05-31

## 2024-05-31 RX ORDER — SODIUM CHLORIDE 9 MG/ML
20 INJECTION, SOLUTION INTRAVENOUS ONCE
Status: COMPLETED | OUTPATIENT
Start: 2024-05-31 | End: 2024-05-31

## 2024-05-31 RX ADMIN — ZOLEDRONIC ACID 4 MG: 0.04 INJECTION, SOLUTION INTRAVENOUS at 13:58

## 2024-05-31 RX ADMIN — SODIUM CHLORIDE 20 ML/HR: 9 INJECTION, SOLUTION INTRAVENOUS at 13:58

## 2024-07-18 DIAGNOSIS — E06.3 HYPOTHYROIDISM DUE TO HASHIMOTO'S THYROIDITIS: ICD-10-CM

## 2024-07-18 DIAGNOSIS — I10 ESSENTIAL HYPERTENSION: Primary | ICD-10-CM

## 2024-07-18 DIAGNOSIS — E03.8 HYPOTHYROIDISM DUE TO HASHIMOTO'S THYROIDITIS: ICD-10-CM

## 2024-07-30 LAB
ALBUMIN SERPL-MCNC: 3.9 G/DL (ref 3.5–5.2)
ALBUMIN/GLOB SERPL: 1.3 G/DL
ALP SERPL-CCNC: 68 U/L (ref 39–117)
ALT SERPL-CCNC: 17 U/L (ref 1–33)
AST SERPL-CCNC: 18 U/L (ref 1–32)
BASOPHILS # BLD AUTO: 0.01 10*3/MM3 (ref 0–0.2)
BASOPHILS NFR BLD AUTO: 0.2 % (ref 0–1.5)
BILIRUB SERPL-MCNC: 0.5 MG/DL (ref 0–1.2)
BUN SERPL-MCNC: 8 MG/DL (ref 8–23)
BUN/CREAT SERPL: 7.8 (ref 7–25)
CALCIUM SERPL-MCNC: 9 MG/DL (ref 8.6–10.5)
CHLORIDE SERPL-SCNC: 106 MMOL/L (ref 98–107)
CHOLEST SERPL-MCNC: 180 MG/DL (ref 0–200)
CO2 SERPL-SCNC: 23.5 MMOL/L (ref 22–29)
CREAT SERPL-MCNC: 1.02 MG/DL (ref 0.57–1)
EGFRCR SERPLBLD CKD-EPI 2021: 59.7 ML/MIN/1.73
EOSINOPHIL # BLD AUTO: 0.35 10*3/MM3 (ref 0–0.4)
EOSINOPHIL NFR BLD AUTO: 6.9 % (ref 0.3–6.2)
ERYTHROCYTE [DISTWIDTH] IN BLOOD BY AUTOMATED COUNT: 12.9 % (ref 12.3–15.4)
GLOBULIN SER CALC-MCNC: 3.1 GM/DL
GLUCOSE SERPL-MCNC: 99 MG/DL (ref 65–99)
HCT VFR BLD AUTO: 42.8 % (ref 34–46.6)
HDLC SERPL-MCNC: 45 MG/DL (ref 40–60)
HGB BLD-MCNC: 14.2 G/DL (ref 12–15.9)
IMM GRANULOCYTES # BLD AUTO: 0.01 10*3/MM3 (ref 0–0.05)
IMM GRANULOCYTES NFR BLD AUTO: 0.2 % (ref 0–0.5)
LDLC SERPL CALC-MCNC: 112 MG/DL (ref 0–100)
LYMPHOCYTES # BLD AUTO: 2 10*3/MM3 (ref 0.7–3.1)
LYMPHOCYTES NFR BLD AUTO: 39.7 % (ref 19.6–45.3)
MCH RBC QN AUTO: 31.5 PG (ref 26.6–33)
MCHC RBC AUTO-ENTMCNC: 33.2 G/DL (ref 31.5–35.7)
MCV RBC AUTO: 94.9 FL (ref 79–97)
MONOCYTES # BLD AUTO: 0.52 10*3/MM3 (ref 0.1–0.9)
MONOCYTES NFR BLD AUTO: 10.3 % (ref 5–12)
NEUTROPHILS # BLD AUTO: 2.15 10*3/MM3 (ref 1.7–7)
NEUTROPHILS NFR BLD AUTO: 42.7 % (ref 42.7–76)
NRBC BLD AUTO-RTO: 0 /100 WBC (ref 0–0.2)
PLATELET # BLD AUTO: 291 10*3/MM3 (ref 140–450)
POTASSIUM SERPL-SCNC: 4.6 MMOL/L (ref 3.5–5.2)
PROT SERPL-MCNC: 7 G/DL (ref 6–8.5)
RBC # BLD AUTO: 4.51 10*6/MM3 (ref 3.77–5.28)
SODIUM SERPL-SCNC: 141 MMOL/L (ref 136–145)
TRIGL SERPL-MCNC: 129 MG/DL (ref 0–150)
TSH SERPL DL<=0.005 MIU/L-ACNC: 0.99 UIU/ML (ref 0.27–4.2)
VLDLC SERPL CALC-MCNC: 23 MG/DL (ref 5–40)
WBC # BLD AUTO: 5.04 10*3/MM3 (ref 3.4–10.8)

## 2024-08-05 ENCOUNTER — OFFICE VISIT (OUTPATIENT)
Dept: INTERNAL MEDICINE | Facility: CLINIC | Age: 69
End: 2024-08-05
Payer: MEDICARE

## 2024-08-05 VITALS
SYSTOLIC BLOOD PRESSURE: 130 MMHG | BODY MASS INDEX: 30.61 KG/M2 | HEART RATE: 76 BPM | WEIGHT: 195 LBS | DIASTOLIC BLOOD PRESSURE: 78 MMHG | HEIGHT: 67 IN

## 2024-08-05 DIAGNOSIS — I10 ESSENTIAL HYPERTENSION: Chronic | ICD-10-CM

## 2024-08-05 DIAGNOSIS — M81.0 AGE-RELATED OSTEOPOROSIS WITHOUT CURRENT PATHOLOGICAL FRACTURE: Chronic | ICD-10-CM

## 2024-08-05 DIAGNOSIS — Z00.00 MEDICARE ANNUAL WELLNESS VISIT, SUBSEQUENT: ICD-10-CM

## 2024-08-05 DIAGNOSIS — C50.111 MALIGNANT NEOPLASM OF CENTRAL PORTION OF RIGHT BREAST IN FEMALE, ESTROGEN RECEPTOR POSITIVE: Primary | ICD-10-CM

## 2024-08-05 DIAGNOSIS — E03.8 HYPOTHYROIDISM DUE TO HASHIMOTO'S THYROIDITIS: Chronic | ICD-10-CM

## 2024-08-05 DIAGNOSIS — E06.3 HYPOTHYROIDISM DUE TO HASHIMOTO'S THYROIDITIS: Chronic | ICD-10-CM

## 2024-08-05 DIAGNOSIS — Z17.0 MALIGNANT NEOPLASM OF CENTRAL PORTION OF RIGHT BREAST IN FEMALE, ESTROGEN RECEPTOR POSITIVE: Primary | ICD-10-CM

## 2024-08-05 NOTE — PROGRESS NOTES
Subjective   The ABCs of the Annual Wellness Visit  Medicare Wellness Visit      Kenna Will is a 68 y.o. patient who presents for a Medicare Wellness Visit.    The following portions of the patient's history were reviewed and   updated as appropriate: allergies, current medications, past family history, past medical history, past social history, past surgical history, and problem list.    Compared to one year ago, the patient's physical   health is the same.  Compared to one year ago, the patient's mental   health is the same.    Recent Hospitalizations:  She was admitted within the past 365 days at [facility] in [facility].     Current Medical Providers:  Patient Care Team:  Landy Alberts MD as PCP - General (Internal Medicine)  Landy Alberts MD as Referring Physician (Internal Medicine)  Marlee Case MD as Consulting Physician (Hematology and Oncology)    Outpatient Medications Prior to Visit   Medication Sig Dispense Refill    anastrozole (ARIMIDEX) 1 MG tablet       Calcium Carbonate-Vit D-Min (CALCIUM 1200 PO) Take  by mouth.      ibandronate (BONIVA) 150 MG tablet Take 1 tablet by mouth Every 30 (Thirty) Days.      levothyroxine (SYNTHROID, LEVOTHROID) 88 MCG tablet TAKE ONE TABLET BY MOUTH DAILY 90 tablet 4    losartan (COZAAR) 50 MG tablet TAKE ONE TABLET BY MOUTH DAILY 90 tablet 4    Multiple Vitamins-Minerals (MULTI COMPLETE PO) Take  by mouth.      cholecalciferol (VITAMIN D3) 25 MCG (1000 UT) tablet Take 1 tablet by mouth Daily. (Patient not taking: Reported on 8/5/2024)       No facility-administered medications prior to visit.     No opioid medication identified on active medication list. I have reviewed chart for other potential  high risk medication/s and harmful drug interactions in the elderly.      Aspirin is not on active medication list.  Aspirin use is not indicated based on review of current medical condition/s. Risk of harm outweighs potential benefits.  .    Patient  "Active Problem List   Diagnosis    Essential hypertension    Hypothyroidism due to Hashimoto's thyroiditis    Tubular adenoma of colon    S/P JO-ANN-BSO (total abdominal hysterectomy and bilateral salpingo-oophorectomy)    Malignant neoplasm of central portion of right breast in female, estrogen receptor positive    History of Rosaline-en-Y gastric bypass    Age-related osteoporosis without current pathological fracture    Osteopenia due to cancer therapy     Advance Care Planning (Click this link to access ACP Navigator)  Advance Directive is not on file.  ACP discussion was held with the patient during this visit. Patient has an advance directive (not in EMR), copy requested.        Objective   Vitals:    24 0843   BP: 130/78   Pulse: 76   Weight: 88.5 kg (195 lb)   Height: 170.2 cm (67.01\")       Estimated body mass index is 30.53 kg/m² as calculated from the following:    Height as of this encounter: 170.2 cm (67.01\").    Weight as of this encounter: 88.5 kg (195 lb).    BMI is >= 30 and <35. (Class 1 Obesity). The following options were offered after discussion;: exercise counseling/recommendations and nutrition counseling/recommendations        Does the patient have evidence of cognitive impairment? No  Lab Results   Component Value Date    CHLPL 180 2024    TRIG 129 2024    HDL 45 2024     (H) 2024    VLDL 23 2024                                                                                                Health  Risk Assessment    Smoking Status:  Social History     Tobacco Use   Smoking Status Never   Smokeless Tobacco Never     Alcohol Consumption:  Social History     Substance and Sexual Activity   Alcohol Use Never     Fall Risk Screen:  STEADI Fall Risk Assessment was completed, and patient is at LOW risk for falls.Assessment completed on:2024    Depression Screenin/5/2024     8:46 AM   PHQ-2/PHQ-9 Depression Screening   Little Interest or Pleasure in " Doing Things 0-->not at all   Feeling Down, Depressed or Hopeless 0-->not at all   PHQ-9: Brief Depression Severity Measure Score 0     Health Habits and Functional and Cognitive Screenin/5/2024     8:45 AM   Functional & Cognitive Status   Do you have difficulty preparing food and eating? No   Do you have difficulty bathing yourself, getting dressed or grooming yourself? No   Do you have difficulty using the toilet? No   Do you have difficulty moving around from place to place? No   Do you have trouble with steps or getting out of a bed or a chair? No   Current Diet Well Balanced Diet   Dental Exam Up to date   Eye Exam Up to date   Exercise (times per week) 2 times per week   Current Exercises Include Walking;Yard Work   Do you need help using the phone?  No   Are you deaf or do you have serious difficulty hearing?  No   Do you need help to go to places out of walking distance? No   Do you need help shopping? No   Do you need help preparing meals?  No   Do you need help with housework?  No   Do you need help with laundry? No   Do you need help taking your medications? No   Do you need help managing money? No   Do you ever drive or ride in a car without wearing a seat belt? No   Have you felt unusual stress, anger or loneliness in the last month? No   Who do you live with? Alone   If you need help, do you have trouble finding someone available to you? No   Have you been bothered in the last four weeks by sexual problems? No   Do you have difficulty concentrating, remembering or making decisions? No             Age-appropriate Screening Schedule:  Refer to the list below for future screening recommendations based on patient's age, sex and/or medical conditions. Orders for these recommended tests are listed in the plan section. The patient has been provided with a written plan.    Health Maintenance List  Health Maintenance   Topic Date Due    TDAP/TD VACCINES (1 - Tdap) Never done    HEPATITIS C SCREENING   "Never done    COVID-19 Vaccine (6 - 2023-24 season) 02/05/2024    BMI FOLLOWUP  08/02/2024    INFLUENZA VACCINE  08/01/2024    MAMMOGRAM  05/08/2025    ANNUAL WELLNESS VISIT  08/05/2025    DXA SCAN  05/08/2029    COLORECTAL CANCER SCREENING  02/04/2032    Pneumococcal Vaccine 65+  Completed    ZOSTER VACCINE  Completed                                                                                                                                                CMS Preventative Services Quick Reference  Risk Factors Identified During Encounter  Immunizations Discussed/Encouraged: Tdap and Influenza    The above risks/problems have been discussed with the patient.  Pertinent information has been shared with the patient in the After Visit Summary.  An After Visit Summary and PPPS were made available to the patient.    Follow Up:   Next Medicare Wellness visit to be scheduled in 1 year.         Additional E&M Note during same encounter follows:  Patient has additional, significant, and separately identifiable condition(s)/problem(s) that require work above and beyond the Medicare Wellness Visit     Chief Complaint  Medicare Wellness-subsequent    Subjective   HPI  DENISE is also being seen today for additional medical problem/s.  She is back and forth between [facility] and [place].  She is being followed for her breast cancer there- recent tumor markers have gone up slightly- has f/u there.  Zometa done here.       Otherwise she continues to be help her daughter -physically and financially- traveling back and forth.  She is active here, does her own yard work, etc.   Has been doing some exercises on line for balance- makes a big difference.     Objective   Vital Signs:  /78   Pulse 76   Ht 170.2 cm (67.01\")   Wt 88.5 kg (195 lb)   BMI 30.53 kg/m²   Physical Exam  Constitutional:       Appearance: Normal appearance.   Cardiovascular:      Rate and Rhythm: Normal rate and regular rhythm.   Pulmonary:      Effort: " "Pulmonary effort is normal.      Breath sounds: Normal breath sounds.   Musculoskeletal:      Right lower leg: No edema.      Left lower leg: No edema.   Lymphadenopathy:      Cervical: No cervical adenopathy.         The following data was reviewed by: Landy Alberts MD on 08/05/2024:     Common labs          5/31/2024    13:03 7/29/2024    09:41   Common Labs   Glucose 109  99    BUN 10  8    Creatinine 1.00  1.02    Sodium 141  141    Potassium 4.4  4.6    Chloride 105  106    Calcium 9.3  9.0    Total Protein  7.0    Albumin 3.8  3.9    Total Bilirubin 0.4  0.5    Alkaline Phosphatase 67  68    AST (SGOT) 23  18    ALT (SGPT) 17  17    WBC 4.52  5.04    Hemoglobin 13.8  14.2    Hematocrit 41.4  42.8    Platelets 304  291    Total Cholesterol  180    Triglycerides  129    HDL Cholesterol  45    LDL Cholesterol   112        Data reviewed : outside hospital notes       Assessment and Plan       BMI is >= 30 and <35. (Class 1 Obesity). The following options were offered after discussion;: exercise counseling/recommendations and nutrition counseling/recommendations          Malignant neoplasm of central portion of right breast in female, estrogen receptor positive    Essential hypertension    Hypothyroidism due to Hashimoto's thyroiditis    Age-related osteoporosis without current pathological fracture    Medicare annual wellness visit, subsequent    Diagnoses and all orders for this visit:    1. Malignant neoplasm of central portion of right breast in female, estrogen receptor positive (Primary)  Comments:  Primary tx in [place].  Feels comfortable with this at least for now.    2. Essential hypertension  Comments:  Controlled, no change  Assessment & Plan:        3. Hypothyroidism due to Hashimoto's thyroiditis  Comments:  TSH at goal    4. Age-related osteoporosis without current pathological fracture  Comments:  oncology recommended Zometa because she \"failed\" Boniva and is on anastrazole. now getting infusion " q6m    5. Medicare annual wellness visit, subsequent  Comments:  feels she is doing well- vaccines reviewed- up to date with cancer screening- working on diet, remains active.               Follow Up   Return in about 1 year (around 8/5/2025) for Medicare Wellness, Lab Before FUP.  Patient was given instructions and counseling regarding her condition or for health maintenance advice. Please see specific information pulled into the AVS if appropriate.

## 2024-12-20 DIAGNOSIS — E06.3 HYPOTHYROIDISM DUE TO HASHIMOTO'S THYROIDITIS: ICD-10-CM

## 2024-12-20 DIAGNOSIS — I10 ESSENTIAL HYPERTENSION: ICD-10-CM

## 2024-12-20 RX ORDER — LEVOTHYROXINE SODIUM 88 UG/1
88 TABLET ORAL DAILY
Qty: 90 TABLET | Refills: 4 | Status: SHIPPED | OUTPATIENT
Start: 2024-12-20

## 2024-12-20 RX ORDER — LOSARTAN POTASSIUM 50 MG/1
50 TABLET ORAL DAILY
Qty: 90 TABLET | Refills: 4 | Status: SHIPPED | OUTPATIENT
Start: 2024-12-20

## 2025-06-16 DIAGNOSIS — I10 ESSENTIAL HYPERTENSION: Primary | ICD-10-CM

## 2025-06-16 DIAGNOSIS — E06.3 HYPOTHYROIDISM DUE TO HASHIMOTO'S THYROIDITIS: ICD-10-CM

## 2025-06-16 DIAGNOSIS — Z79.899 ENCOUNTER FOR LONG-TERM (CURRENT) USE OF MEDICATIONS: ICD-10-CM

## 2025-06-16 DIAGNOSIS — D64.9 ANEMIA, UNSPECIFIED TYPE: ICD-10-CM

## 2025-06-18 NOTE — PROGRESS NOTES
Follow-up, Anemia, and Results    6/19/2025, interval history:  She is here for follow-up.  She was recently seen at [facility] by both medical oncology and surgical oncology.  Received Zometa on 6/3/2025.  Her daughter, who is a resident of [place], is going to be getting  soon and plans to move to [place].  If her daughter moved to [place], patient would consider transferring care locally with us.    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    To review her right breast invasive ductal carcinoma history, she presented to her primary care for an annual physical and was noted to have an area of abnormality in her right breast.  Diagnostic bilateral mammogram as well as right breast ultrasound showed 2 adjacent irregular areas at 10:00.  The right breast 10:00 region, 13 cm from nipple was biopsied, revealing invasive ductal carcinoma (ER/NH positive, HER2 negative).  She was also noted to have 3 irregular appearing right axillary lymph nodes of the largest lymph node was positive for metastatic carcinoma.  Left axilla was also noted to have questionable possible abnormal lymph nodes.    Further evaluation with a PET scan done on 4/29/2021 showed multiple right axillary intensely hypermetabolic lymph nodes consistent with biopsy-proven stephanie metastases, a solitary mildly active nonenlarged left axillary lymph node which was nonspecific; hypermetabolic upper outer quadrant right breast malignant disease; renal calculi and no distant sites of metastases    Breast MRI done on 4/29/2021 showed right breast with 2.4 cm mass at 10:00 highly suspicious for malignancy and compatible with newly diagnosed carcinoma with evidence of right axillary lymphadenopathy and multiple abnormal appearing axillary lymph nodes.  No MRI  evidence of malignancy in left breast.  However, an 18 mm left axillary lymph node was considered abnormal but possibly related to prior COVID vaccination.  She then underwent neoadjuvant chemotherapy at [facility] (Dr. Ryan).  She completed 12 cycles of weekly Taxol on 8/2/2021 followed by 4 cycles of AC on 10/4/2021    She also developed a right soleal acute DVT in October 2021, and was treated with Eliquis    MRI breast as well as mammogram done in October 2021 after completion of neoadjuvant chemotherapy showed improvement in the appearance of primary tumor as well as right axillary lymph node.  No other abnormal right axilla lymph node seen.    On 11/9/2021 she underwent right JYOTI localized segmental mastectomy with sentinel lymph node biopsy.  Pathology confirmed multiple foci of residual carcinoma within tumor bed.  Largest foci was 3 mm, intermediate grade.  DCIS was present.  Margins negative.  3 sentinel lymph node biopsied, 2 nodes contained ITCs only.  No macro or micrometastases.  (ypT1a N0).  She then underwent completion right axillary lymph node dissection on 11/23/2021.  Pathology revealed 3/14 lymph nodes with isolated tumor cells.    Postop course was complicated by right axillary abscess requiring I&D on 1/7/2022.  In May 2022, she was started on cephalexin p.o. for erythematous right breast skin changes with induration.  She underwent I&D of right breast abscess on 6/2/2022, and completed a course of Bactrim.    She completed radiation on 2/17/2022.  This was complicated by skin blisters.  She was then started on Arimidex.    In August 2023, clinical breast exam was notable for a palpable abnormality in right breast at 10:00-11 o'clock position.  Directed ultrasound showed mixed echogenic mass measuring 5.2 x 2.1 x 3.1 cm with no internal color flow or posterior findings.  Felt to be suggestive of a prominent surgical seroma or fat necrosis.  Short-term follow-up ultrasound was  suggested if clinically warranted (BI-RADS 3).  A right diagnostic mammogram on 11/15/2023 revealed amorphous and indistinct calcification within lumpectomy site which was felt to be benign.  Follow-up bilateral diagnostic mammogram recommended in 6 months (BI-RADS 3)    She underwent CT chest without contrast as well as bilateral diagnostic mammogram on 5/8/2024.  Mammogram revealed probably benign calcifications in the lumpectomy bed.  BI-RADS 3.  CT chest without contrast revealed a 2.7 x 2 cm nodule, previously measuring 2.2 x 2.1 cm without change to slightly increase in size.  Focal area of fat necrosis within the right breast associated with surgical clips without change.    Today, she is here to establish care with me.  She recently saw Dr. Felder at [facility] on 10 May 2024 for concerns of uptrending tumor markers as well as osteopenia.  I do not have the notes for my review.  However, per patient, they discussed starting her on Zometa 4 mg every 6 months.  Patient requesting to get her first dose here with us, and then she will be subsequently followed in [place].  She has a repeat mammogram scheduled in [place] in 6 months.  She prefers to see us annually while she continues to see [facility] every 6 months.  She states compliance with Arimidex.  Her only issues are hot flashes, mostly at night.  She is not interested in any pharmaceutical therapies for hot flashes.  She reports she had some neuropathy with chemotherapy, however, it is now resolved.  She denies any other concerns or complaints.  She plans to return to [place] in August for a 3-month visit with Dr. Felder            MEDICATIONS    Current Outpatient Medications:     anastrozole (ARIMIDEX) 1 MG tablet, , Disp: , Rfl:     Calcium Carbonate-Vit D-Min (CALCIUM 1200 PO), Take  by mouth., Disp: , Rfl:     levothyroxine (SYNTHROID, LEVOTHROID) 88 MCG tablet, TAKE 1 TABLET BY MOUTH DAILY., Disp: 90 tablet, Rfl: 4     "losartan (COZAAR) 50 MG tablet, TAKE 1 TABLET BY MOUTH DAILY, Disp: 90 tablet, Rfl: 4    Multiple Vitamins-Minerals (MULTI COMPLETE PO), Take  by mouth., Disp: , Rfl:     ALLERGIES:     Allergies   Allergen Reactions    Oxycodone-Acetaminophen Other (See Comments)     HALLUCINATIONS    Sulfa Antibiotics Rash       I have reviewed Past Medical, Surgical History, Social History, Meds and Allergies.     REVIEW OF SYSTEMS:  See interval History           Vitals:    06/19/25 1253   BP: 107/76   Pulse: 89   Resp: 14   Temp: 97.6 °F (36.4 °C)   TempSrc: Temporal   SpO2: 96%   Weight: 90.2 kg (198 lb 14.4 oz)   Height: 170.2 cm (67.01\")   PainSc: 0-No pain           6/19/2025    12:51 PM   Current Status   ECOG score 0     Physical Exam  Constitutional:       Appearance: Normal appearance.   HENT:      Head: Normocephalic and atraumatic.   Cardiovascular:      Rate and Rhythm: Normal rate and regular rhythm.      Heart sounds: No murmur heard.  Pulmonary:      Effort: Pulmonary effort is normal. No respiratory distress.      Breath sounds: Normal breath sounds.   Chest:      Comments: 6/19/2025: Breast exam deferred today  Musculoskeletal:         General: No swelling.   Neurological:      General: No focal deficit present.      Mental Status: She is alert.            RECENT LABS:        WBC   Date Value Ref Range Status   07/29/2024 5.04 3.40 - 10.80 10*3/mm3 Final   05/31/2024 4.52 3.40 - 10.80 10*3/mm3 Final   07/28/2023 4.43 3.40 - 10.80 10*3/mm3 Final   03/30/2022 3.9 3.4 - 10.8 x10E3/uL Final   03/25/2021 5.62 3.40 - 10.80 10*3/mm3 Final   01/02/2020 6.54 3.40 - 10.80 10*3/mm3 Final     Hemoglobin   Date Value Ref Range Status   07/29/2024 14.2 12.0 - 15.9 g/dL Final   05/31/2024 13.8 12.0 - 15.9 g/dL Final   07/28/2023 13.3 12.0 - 15.9 g/dL Final   03/30/2022 11.2 11.1 - 15.9 g/dL Final   03/25/2021 13.3 12.0 - 15.9 g/dL Final   01/02/2020 12.0 12.0 - 15.9 g/dL Final     Platelets   Date Value Ref Range Status "   07/29/2024 291 140 - 450 10*3/mm3 Final   05/31/2024 304 140 - 450 10*3/mm3 Final   07/28/2023 301 140 - 450 10*3/mm3 Final   03/30/2022 377 150 - 450 x10E3/uL Final   03/25/2021 383 140 - 450 10*3/mm3 Final   01/02/2020 425 140 - 450 10*3/mm3 Final     Pathology  4/5/2021  1. Right Breast, 10:00, 13 cm FN, U/S-Guided Core Needle Biopsy for a Mass:               A. INVASIVE DUCTAL CARCINOMA, Poorly differentiated; [place] Histologic Grade III/III (tubule  score = 3, nuclear score = 3, mitoses score = 2), measuring at least 12 mm.               B. FOCAL HIGH GRADE DUCTAL CARCINOMA IN SITU (DCIS), Solid type with single cell necrosis.               C. Positive for lymphovascular space invasion.                D. See Comment and Biomarker Template #1.     2. Lymph Node, Right Axilla, U/S-Guided Core Needle Biopsy:               A. METASTATIC MAMMARY CARCINOMA.               B. Metastatic focus measures 6 mm.               C. No definitive extranodal extension is present.               D. See Comment and Biomarker Template #2.    Imaging  4/5/2021 diagnostic bilateral mammogram  IMPRESSION:  1. There are 2 adjacent irregular lesions at the 10 o'clock axis in the right breast on the order of 12 cm and 13 cm from the nipple that measure on the order of 0.5 cm and 0.8 cm in greatest dimension.  Additionally, there are linear areas of increased density seen mammographically in the middle and posterior one-thirds of the upper outer quadrant of the right breast in conjunction with abnormal-appearing right axillary lymph nodes. Ultrasound-guided biopsy of the lesion at the 10 o'clock position on the order of 13 cm from the nipple as well as the dominant abnormal-appearing right axillary lymph node is recommended.  2. There are no findings suspicious for malignancy in the left breast.     BI-RADS CATEGORY 4C: Suspicious abnormality. Biopsy should be  considered.    4/29/2021 MRI breast, bilateral  IMPRESSION:   1. Left  breast: No MRI evidence of malignancy. An 18 mm left axillary lymph node   is demonstrated, considered abnormal but possibly related to prior Covid   vaccination.     2. Right breast: 2.4 x 2.3 cm indistinct irregular enhancing mass centered at   the 10:00 position, highly suspicious for malignancy and compatible with the   reported newly diagnosed carcinoma. There is evidence of post needle biopsy   changes but no separate MRI evidence of malignancy is identified. There is also   evidence of right axillary lymphadenopathy, with multiple abnormal appearing   axillary nodes, as above.     4/29/2021 PET scan  Impression    1. Multiple right axillary nodes are intensely hypermetabolic, consistent with presence of biopsy proven stephanie metastases from breast malignancy. A solitary mildly active nonenlarged left axillary node is nonspecific.  2. Upper outer right breast malignant disease is moderately hypermetabolic  3. Renal calculi  4. There are no distant sites of hypermetabolism to indicate metastasis elsewhere    10/5/2021 bilateral lower extremity duplex    RIGHT: Abnormal: There is acute thrombus in the soleal vein.    There is acute thrombus in the soleal vein. The remainder of the deep and superficial veins of the right lower extremity is patent and without thrombus. There is no reflux in the deep veins in the supine position.    LEFT: Normal.    The deep and superficial veins of the left lower extremity are patent and without thrombus. There is no reflux in the deep veins in the supine position.    COMPARISON: There are no exams for comparison.    10/7/2021 MRI bilateral breast  Right Breast:  No abnormal enhancement remaining in the region of the biopsy-proven neoplasm. Axillary lymph nodes have returned to normal.  Left Breast: No MRI features of malignancy. Dermal lesion lower outer quadrant which was not present on prior mammograms nor CT and likely reflects benign dermal lesion or inflammatory process.      10/13/2021 bilateral diagnostic mammogram  1. Improvement in the appearance and size of the primary tumor and the right axillary lymph node which had been biopsied. No other abnormal right axillary lymph nodes were seen on the current ultrasound.   2. The calcific-like foci projecting over both breasts should be followed with bilateral mammography in 6 months.   3. Findings were discussed with Antonio Siddiqui M.D. on 10/13/2021 11:34 AM. We went over the images together. A summary sheet will be provided to the patient.     BI-RADS: 6 - Known Biopsy-Proven Malignancy   RECOMMENDATION: Known Cancer     5/8/2024 DEXA scan  Diagnostic classification using World Health Organization (WHO) criteria:   There is osteopenia of bilateral femoral neck with T score -2.4   No significant change compared to 2022   Consider ordering bone density with extremities for future follow-up. The lumbar region is not suitable for evaluation     5/8/2024 CT chest without contrast  FINDINGS:   TUBES AND IMPLANTS: There are no tubes or implants noted.     LUNG AND AIRWAYS: The lungs are clear. There are no nodules or infiltrates.   PLEURA: There are no pleural effusions.   AORTA: Within normal limits.   CORONARY ARTERY: No coronary artery calcifications seen.   PULMONARY ARTERIES: Normal appearance.   HEART: The heart is normal in size.   MEDIASTINUM AND VEE:  There are no enlarged hilar or mediastinal lymph nodes.   CHEST WALL AND LOWER NECK:   Nodule 2.7 x 2 cm in diameter previously 2.2 x 2.1 cm without change to slightly increase in size. Focal area of fat necrosis seen within the right breast associated with surgical clips without change.   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   BONES: No suspicious osseous lesions.     1.  Postoperative changes status post Rosaline-en-Y gastric bypass.   2.  No change to slight increase in size of right axillary lymph node/nodule.   3.  Postoperative changes of the right  breast.     5/8/2024 bilateral diagnostic mammogram  Probable benign as above. Six-month follow-up right mammogram recommended    BI-RADS: 3 - Probably Benign  RECOMMENDATION: Follow up in 6 months    Reviewed and Interpreted by: Wendi Contreras M.D.  5/8/2024 8:40 AM  Narrative    This result has an attachment that is not available.      MAMMOGRAM DIAGNOSTIC BILATERAL W 3D TOMOSYNTHESIS (DBT) 5/8/2024 8:19 AM    CLINICAL INDICATION: The patient is here for short-term follow-up calcifications followed since November 2023. History of right lumpectomy with radiation therapy November 2021. Family history breast cancer sister in her 50s    COMPARISON: Comparison is made with available prior studies dating back to 4/6/2021    TECHNIQUE: Bilateral digital 3-D Tomosynthesis images were obtained including 2D C-view images along with utilization of CAD.    FINDINGS:  The breast tissue displays scattered areas of fibroglandular density.  There are postlumpectomy changes upper outer posterior right breast. Magnified views were performed in the area of probable benign calcifications in the lumpectomy bed. The calcifications appear stable and are felt most likely related to developing fat necrosis. There is no new dominant mass, architectural distortion, on the right. The left breast is unremarkable and unchanged      Assessment & Plan     *Right breast invasive ductal carcinoma with right axillary lymph node metastases, ER/MO positive, HER2 negative  Diagnosed in April 2021-right breast abnormality noted on annual physical exam by primary care.    April 2021 bilateral diagnostic mammogram: There are 2 adjacent irregular lesions at 10:00 axis, 12 and 13 cm from the nipple, measuring 0.5 and 0.8 cm.  Abnormal appearing right axillary lymph nodes.  April 2021 ultrasound-guided right breast lesion 10 o'clock position, about 13 cm from the nipple; and right axillary lymph node biopsy-invasive ductal carcinoma, poorly  differentiated.  Grade 3.  Focal high-grade DCIS solid type with single cell necrosis.  Positive for LVI.    4/29/2021 MRI bilateral breast: 2.4 x 2.3 cm indistinct irregular enhancing mass at 10 o'clock position and right breast.  No MRI evidence of malignancy in left breast.  An 18 mm left axillary lymph node demonstrated, considered abnormal, possibly related to prior COVID vaccination.  4/29/2021 PET scan: Multiple right axillary lymph nodes intensely Hypermetabolic.  Solitary mildly active nonenlarged left axillary lymph node, nonspecific.  Upper outer right breast malignant disease moderately hypermetabolic.  No evidence of distant mets  October 2021: S/p neoadjuvant chemotherapy at [facility] (Dr. Ryan).  She completed 12 cycles of weekly Taxol on 8/2/2021 followed by 4 cycles of AC on 10/4/2021  October 2021 MRI bilateral breast, and diagnostic bilateral mammogram (post neoadjuvant chemotherapy)-improvement in appearance and size of primary tumor and right axillary lymph node.  11/9/2021: right JYOTI localized segmental mastectomy with sentinel lymph node biopsy.  Path - multiple foci of residual carcinoma within tumor bed.  Largest foci was 3 mm, intermediate grade.  DCIS was present.  Margins negative.  3 sentinel lymph node biopsied, 2 nodes contained ITCs only.  No macro or micrometastases.  (ypT1a N0)  11/23/2021 s/p completion right axillary dissection given 15% risk of residual tumor within remaining axillary nodes.  Path-3/14 lymph nodes with isolated tumor cells  2/17/2022: Completed radiation therapy  February 2022: Started on Arimidex  August 2023 right breast diagnostic ultrasound (for evaluation of palpable abnormality on clinical breast exam Dr. Close-5.2 x 2.1 x 3.1 cm mixed echogenic mass with no internal color flow or posterior findings.  BI-RADS 3.  November 2023 right breast diagnostic mammogram: Amorphous and indistinct calcification within the lumpectomy site.  Follow-up  bilateral diagnostic mammogram in 6 months recommended.  BI-RADS 3.  5/8/2024 bilateral diagnostic mammogram: Postlumpectomy changes upper outer posterior right breast.  Calcifications, probably benign in lumpectomy bed.  BI-RADS 3.  6-month follow-up right mammogram recommended  5/8/2024 CT chest without contrast: CT chest without contrast revealed a 2.7 x 2 cm nodule, previously measuring 2.2 x 2.1 cm without change to slightly increase in size.  Focal area of fat necrosis within the right breast associated with surgical clips without change.  June 2025: Patient has been following with [facility].  Reviewed most recent bilateral diagnostic mammogram from May 2500-RD-[id number] 3-probably benign.  Follow-up in 6 months recommended.    *Anemia  6/8/2025 hemoglobin 11.1.  Folate 18.7, ferritin 7, total iron 77, TIBC 388, iron saturation 20%.    She has been started on B12 and oral iron supplements by PCP    *Osteopenia  -5/8/2024 DEXA scan axial-osteopenia of bilateral femoral neck, T-score -2.4.  No significant change compared with 2022.  - 5/31/2024: Started Zometa per Dr. Felder recommendations  -6/3/2025: Most recent Zometa infusion at [facility].    *History of right soleal vein thrombus  -10/5/2021 bilateral lower extremity venous duplex-acute right soleal vein thrombus.  -Treated with Eliquis for 6 months      Plan  Continue Arimidex  She has appointment in 6 months with medical and surgical oncology at [facility] and for Zometa.  We will see her on an as-needed basis.  She will call us if she decides to transfer care locally with us

## 2025-06-19 ENCOUNTER — OFFICE VISIT (OUTPATIENT)
Dept: ONCOLOGY | Facility: CLINIC | Age: 70
End: 2025-06-19
Payer: MEDICARE

## 2025-06-19 VITALS
TEMPERATURE: 97.6 F | HEIGHT: 67 IN | DIASTOLIC BLOOD PRESSURE: 76 MMHG | HEART RATE: 89 BPM | RESPIRATION RATE: 14 BRPM | WEIGHT: 198.9 LBS | SYSTOLIC BLOOD PRESSURE: 107 MMHG | OXYGEN SATURATION: 96 % | BODY MASS INDEX: 31.22 KG/M2

## 2025-06-19 DIAGNOSIS — M81.0 AGE-RELATED OSTEOPOROSIS WITHOUT CURRENT PATHOLOGICAL FRACTURE: ICD-10-CM

## 2025-06-19 DIAGNOSIS — Z17.0 MALIGNANT NEOPLASM OF CENTRAL PORTION OF RIGHT BREAST IN FEMALE, ESTROGEN RECEPTOR POSITIVE: ICD-10-CM

## 2025-06-19 DIAGNOSIS — C50.111 MALIGNANT NEOPLASM OF CENTRAL PORTION OF RIGHT BREAST IN FEMALE, ESTROGEN RECEPTOR POSITIVE: ICD-10-CM

## 2025-06-19 DIAGNOSIS — M85.80 OSTEOPENIA DUE TO CANCER THERAPY: Primary | ICD-10-CM

## 2025-06-19 PROCEDURE — 3078F DIAST BP <80 MM HG: CPT | Performed by: STUDENT IN AN ORGANIZED HEALTH CARE EDUCATION/TRAINING PROGRAM

## 2025-06-19 PROCEDURE — 1126F AMNT PAIN NOTED NONE PRSNT: CPT | Performed by: STUDENT IN AN ORGANIZED HEALTH CARE EDUCATION/TRAINING PROGRAM

## 2025-06-19 PROCEDURE — 99213 OFFICE O/P EST LOW 20 MIN: CPT | Performed by: STUDENT IN AN ORGANIZED HEALTH CARE EDUCATION/TRAINING PROGRAM

## 2025-06-19 PROCEDURE — 3074F SYST BP LT 130 MM HG: CPT | Performed by: STUDENT IN AN ORGANIZED HEALTH CARE EDUCATION/TRAINING PROGRAM

## 2025-07-14 ENCOUNTER — OFFICE VISIT (OUTPATIENT)
Dept: INTERNAL MEDICINE | Facility: CLINIC | Age: 70
End: 2025-07-14
Payer: MEDICARE

## 2025-07-14 VITALS — WEIGHT: 197 LBS | BODY MASS INDEX: 30.92 KG/M2 | HEIGHT: 67 IN

## 2025-07-14 DIAGNOSIS — M54.16 LEFT LUMBAR RADICULOPATHY: Primary | ICD-10-CM

## 2025-07-14 PROCEDURE — 99213 OFFICE O/P EST LOW 20 MIN: CPT | Performed by: INTERNAL MEDICINE

## 2025-07-14 PROCEDURE — G2211 COMPLEX E/M VISIT ADD ON: HCPCS | Performed by: INTERNAL MEDICINE

## 2025-07-14 PROCEDURE — 1126F AMNT PAIN NOTED NONE PRSNT: CPT | Performed by: INTERNAL MEDICINE

## 2025-07-14 NOTE — PROGRESS NOTES
"Chief Complaint  Sciatica    Subjective        Kenna Will presents to [facility]  History of Present Illness since about mid May, she has had L sided back pain, down leg and into instep of foot. Has been doing some home exercises aimed at sciatica and SI joint arthritis. Did have a DVT in the R leg during chemo (treated with 6 months anticoag)  Wakes up pain free but hurts by the time she gets to bathroom.   Dexa done- osteopenia. Currently NAD in breast cancer f/u.     Objective   Vital Signs:  Ht 170.2 cm (67\")   Wt 89.4 kg (197 lb)   BMI 30.85 kg/m²   Estimated body mass index is 30.85 kg/m² as calculated from the following:    Height as of this encounter: 170.2 cm (67\").    Weight as of this encounter: 89.4 kg (197 lb).            Physical Exam  Neurological:      Gait: Gait normal.      Deep Tendon Reflexes: Reflexes normal.        Result Review :                Assessment and Plan   Diagnoses and all orders for this visit:    1. Left lumbar radiculopathy (Primary)  Comments:  will start with PT- to cont with walking, home exercises as she's able.  Orders:  -     Ambulatory Referral to Physical Therapy for Evaluation & Treatment             Follow Up   No follow-ups on file.  Patient was given instructions and counseling regarding her condition or for health maintenance advice. Please see specific information pulled into the AVS if appropriate.           "

## 2025-07-18 ENCOUNTER — TREATMENT (OUTPATIENT)
Age: 70
End: 2025-07-18
Payer: MEDICARE

## 2025-07-18 DIAGNOSIS — M54.16 RADICULOPATHY, LUMBAR REGION: ICD-10-CM

## 2025-07-18 DIAGNOSIS — R26.89 DECREASED FUNCTIONAL MOBILITY: ICD-10-CM

## 2025-07-18 DIAGNOSIS — M54.50 ACUTE LEFT-SIDED LOW BACK PAIN WITHOUT SCIATICA: Primary | ICD-10-CM

## 2025-07-18 NOTE — PATIENT INSTRUCTIONS
Access Code: [email or web address]  URL: [email or web address]  Date: 07/18/2025  Prepared by: Nina Sr    Exercises  - Supine Lower Trunk Rotation  - 2 x daily - 7 x weekly - 2 sets - 10 reps  - Supine Posterior Pelvic Tilt  - 2 x daily - 7 x weekly - 2 sets - 10 reps - 3 s hold  - Supine Hip Adduction Isometric with Ball  - 2 x daily - 7 x weekly - 2 sets - 10 reps - 3 s hold  - Hooklying Abduction with Resistance  - 2 x daily - 7 x weekly - 2 sets - 10 reps  - Hooklying Single Knee to Chest Stretch with Towel  - 2 x daily - 7 x weekly - 1 sets - 5 reps - 5 s hold  - Seated Lumbar Flexion Stretch  - 2 x daily - 7 x weekly - 1 sets - 10 reps

## 2025-07-18 NOTE — PROGRESS NOTES
Physical Therapy Initial Evaluation and Plan of Care  [address] [facility]  [facility] [address]  P: [phone]  F: [phone]    Patient: Kenna Will   : 1955  Diagnosis/ICD-10 Code:  Acute left-sided low back pain without sciatica [M54.50]  Referring practitioner: Landy Alberts MD  Date of Initial Visit: 2025  Today's Date: 2025  Patient seen for 1 session         Visit Diagnoses:    ICD-10-CM ICD-9-CM   1. Acute left-sided low back pain without sciatica  M54.50 724.2   2. Radiculopathy, lumbar region  M54.16 724.4   3. Decreased functional mobility  R26.89 781.99         Subjective Questionnaire: Oswestry: 44%      Subjective Evaluation    History of Present Illness  Mechanism of injury: Pt is a 68 y/o female who presents to PT with c/o L posterior glute pain that went down the back of her leg and began to worsen and wrap to the front of her left thigh and wraps around the back of her left calf. She doesn't recall a MELIDA or change in activity, but reports it started at the end of May.  Pt reports she wakes up pain free and by the time she  takes 10 steps she feels it. Pt reports it varies in intensity and occurrence. She reports it has kept her up only one night. Pt reports the stairs are the worst. Pt reports also standing on one legs bother her. In addition, she reports her house is carpeted, but when she does step on the part that is hardwood it really hurts. Pt denies numbness/tingling. Pt reports that she can walk as long as she wants but it hurts and she has a limp. Pt reports that she feels like she leans forward when she walks now and she didn't before.       Patient Occupation: Does not currently work Pain  Current pain ratin  Quality: dull ache and radiating  Relieving factors: change in position and rest  Aggravating factors: stairs, standing, movement, lifting, prolonged positioning, ambulation, squatting and repetitive movement  Progression:  worsening    Social Support  Lives in: multiple-level home  Lives with: alone    Diagnostic Tests  No diagnostic tests performed    Treatments  Current treatment: physical therapy  Patient Goals  Patient goals for therapy: increased strength, independence with ADLs/IADLs, return to sport/leisure activities, increased motion, decreased pain and improved balance         Past Medical History:   Diagnosis Date    Benign essential hypertension     Hypothyroidism     Iron deficiency anemia     Malignant neoplasm of central portion of right breast in female, estrogen receptor positive 5/7/2021    Rosacea     Vitamin B12 deficiency     Vitamin D deficiency      Past Surgical History:   Procedure Laterality Date    BREAST BIOPSY      Benign    COLONOSCOPY  2017    [facility] - Q5    DIAGNOSTIC LAPAROSCOPY      GASTRIC BYPASS      HYSTERECTOMY      LIPOMA EXCISION      Middle forehead (excised x2)   Benign    OOPHORECTOMY      US GUIDED LYMPH NODE BIOPSY  4/20/2021         Objective        Special Questions      Additional Special Questions  Denies sinister symptoms      Postural Observations  Seated posture: poor  Standing posture: poor      Neurological Testing     Sensation     Lumbar   Left   Intact: light touch    Right   Intact: light touch    Active Range of Motion     Lumbar   Flexion: WFL  Extension: 15 degrees with pain  Left lateral flexion: WFL  Right lateral flexion: WFL  Left rotation: 25 degrees with pain  Right rotation: WFL    Additional Active Range of Motion Details  Rotation number denotes percent limitation, radiating pain with lumbar rotation    Strength/Myotome Testing     Left Hip   Planes of Motion   Flexion: 4+  Extension: 3  Abduction: 3+  Adduction: 5    Right Hip   Planes of Motion   Flexion: 5  Extension: 3  Abduction: 4  Adduction: 5    Left Knee   Flexion: 5  Extension: 5    Right Knee   Flexion: 5  Extension: 5    Left Ankle/Foot   Dorsiflexion: 5  Plantar flexion: 5    Right Ankle/Foot    Dorsiflexion: 5  Plantar flexion: 5    Additional Strength Details  Unable to perform supine to long sit transfer in position 1-3 for abdominal functional strength testing, uses more right side initiating movement, none on left    Tests       Thoracic   Negative slump.     Lumbar     Left   Positive passive SLR and quadrant.     Right   Negative crossed SLR and passive SLR.     Additional Tests Details  Pain with left ext/rotation/side bending quadrant    Ambulation   Weight-Bearing Status   Weight-Bearing Status (Left): full weight bearing   Weight-Bearing Status (Right): full weight-bearing    Assistive device used: none    Ambulation: Stairs   Ascend stairs: independent  Pattern: reciprocal  Railings: one rail  Descend stairs: independent  Pattern: reciprocal  Railings: one rail    Additional Stairs Ambulation Details  Has pain with stairs    Observational Gait   Gait: antalgic   Increased right stance time. Decreased walking speed, stride length and left stance time.   Left arm swing: high guard  Right arm swing: high guard          Assessment & Plan       Assessment  Impairments: abnormal or restricted ROM, activity intolerance, impaired physical strength, lacks appropriate home exercise program and pain with function   Functional limitations: carrying objects, lifting, sleeping, walking, pulling, pushing, uncomfortable because of pain, moving in bed, sitting, standing, stooping, reaching overhead and unable to perform repetitive tasks   Assessment details: Pt is a 68 y/o female who presents to physical therapy with signs and symptoms consistent with acute low back pain with lumbar radiculopathy. Pt has decreased lumbar ROM, limited with extension and left rotation due to pain and has no pain with flexion and right rotation.  In addition, pt has bilateral LE and abdominal strength deficits. She has intermittent radiating posterior left lower extremity pain that wraps to front of her thigh and down the back of  her calf with weight bearing positions.  She has a positive quadrant test on left side and positive left sided passive SLR. Pt has pain and difficulty performing ambulating, prolonged standing, and going up/down the stairs.  She has no numbness/tingling in her left lower extremity. Her condition is evolving in nature and multiple co morbidities that may affect care. Pt would benefit from skilled physical therapy in order to address the above impairments and activity limitations outlined in this initial evaluation, in order to decrease pain, improve functional mobility, and return to PLOF.   Barriers to therapy: personal factors: lives alone, age  Prognosis: good    Goals  Plan Goals: STG 2-4 weeks    1. Pt will be independent in home exercise  2. Pt will be able to decrease NURIA score to <30% to demonstrate subjective improvement in functional ADLs/IADLs  3. Pt will be able to ascend/descend one flight of stairs with one hand rail without complaints of radiating left lower extremity symptoms  4. Pt will improve hip abductor strength to 4/5 to demonstrate improvement in strength for climbing stairs      LTG 4-6 weeks    1. Pt will be able to decrease NURIA score to <20% to demonstrate subjective improvement in functional ADLs/IADLs  2. Pt will be able to have pain free active lumbar extension and left rotation ROM in order to return to PLOF  3. Pt will be able to lift 5 lbs from floor to stand demonstrating good lifting mechanics and without c/o low back pain  4. Pt will be able to ambulate 350 feet with improved upright posture, step length, and arm swing without c/o radiating low back pain      Plan  Therapy options: will be seen for skilled therapy services  Planned modality interventions: cryotherapy and thermotherapy (hydrocollator packs)  Planned therapy interventions: ADL retraining, abdominal trunk stabilization, balance/weight-bearing training, body mechanics training, flexibility, functional ROM exercises,  home exercise program, IADL retraining, joint mobilization, manual therapy, neuromuscular re-education, postural training, soft tissue mobilization, spinal/joint mobilization, strengthening, stretching, therapeutic activities and transfer training  Frequency: 2x week  Duration in weeks: 8  Treatment plan discussed with: patient            Timed:         Manual Therapy:    0     mins  05984;     Therapeutic Exercise:    13     mins  78085;     Neuromuscular Leticia:    0    mins  59352;    Therapeutic Activity:     10     mins  80031;     Gait Trainin     mins  47437;     Ultrasound:     0     mins  93150;    Ionto                               0    mins   40244  Self Care                       0     mins   39295  Canalith Repos    0     mins 90650      Un-Timed:  Electrical Stimulation:    0     mins  93717 (MC );  Dry Needling     0     mins self-pay  Traction     0     mins 40166        Timed Treatment:   23   mins   Total Treatment:     55   mins          PT: Nina Sr PT     License Number: [id number]      Certification Period: 2025 thru 2025  I certify that the therapy services are furnished while this patient is under my care.  The services outlined above are required by this patient, and will be reviewed every 30 days.         Physician Signature:__________________________________________________    PHYSICIAN: Landy Alberts MD  NPI: [id number]                                            Please sign and return via fax to [phone] Thank you, [facility].

## 2025-07-23 ENCOUNTER — TREATMENT (OUTPATIENT)
Age: 70
End: 2025-07-23
Payer: MEDICARE

## 2025-07-23 DIAGNOSIS — R26.89 DECREASED FUNCTIONAL MOBILITY: ICD-10-CM

## 2025-07-23 DIAGNOSIS — M54.50 ACUTE LEFT-SIDED LOW BACK PAIN WITHOUT SCIATICA: Primary | ICD-10-CM

## 2025-07-23 DIAGNOSIS — M54.16 RADICULOPATHY, LUMBAR REGION: ICD-10-CM

## 2025-07-25 ENCOUNTER — TREATMENT (OUTPATIENT)
Age: 70
End: 2025-07-25
Payer: MEDICARE

## 2025-07-25 DIAGNOSIS — R26.89 DECREASED FUNCTIONAL MOBILITY: ICD-10-CM

## 2025-07-25 DIAGNOSIS — M54.16 RADICULOPATHY, LUMBAR REGION: ICD-10-CM

## 2025-07-25 DIAGNOSIS — M54.50 ACUTE LEFT-SIDED LOW BACK PAIN WITHOUT SCIATICA: Primary | ICD-10-CM

## 2025-07-25 NOTE — PROGRESS NOTES
Physical Therapy Daily Treatment Note    [facility]  [address]  [address]  [place] [address]     Patient: Kenna Will   : 1955  Referring practitioner: Landy Alberts MD  Date of Initial Visit: Type: THERAPY  Noted: 2025  Today's Date: 25  Patient seen for 2 sessions         Kenna Will reports: L LE symptoms.  Pain when I get up and walk to bathroom.  Good sleeping/lying down.         Subjective     Objective   See Exercise, Manual, and Modality Logs for complete treatment.   Exercise rationale/ pain free exercise performance  Anatomy and structure of affected musculature  Posture/Postural awareness  Lumbar support  Sleeping positions with pillows  Alternate exercise positions  Verbal/Tactile cues to ensure correct exercise performance/technique    Added:Nu step x 12, LTR, bkfo, sktc, cross legged piriformis(push out and pull across), 90/90 hs R and sciatic nerve glide L      Assessment/Plan Compliant/Cooperative with rehab efforts.  Subjectively reports no increased symptoms or discomfort with therapeutic exercise today.  Able to perform increased exercise/functional activity without increased LBP/LLE symptoms.  Benefits from verbal/tactile cues to ensure proper exercise positioning, technique and performance as well as education regarding condition and strategies to improve posture, sleep and ambulation..          Progress strengthening /stabilization /functional activity           Timed:  Manual Therapy:         mins  33952;  Therapeutic Exercise:    26     mins  14335;     Neuromuscular Leticia:        mins  00091;    Therapeutic Activity:     12     mins  43747;     Gait Training:           mins  51721;     Ultrasound:          mins  87649;    Self Care                    _15__      mins 87149    Untimed:  Electrical Stimulation:         mins  12999 ( );  Mechanical Traction:         mins  68497;     Timed Treatment:   53   mins   Total  Treatment:     53   mins  Fer Duvall, [facility]  Physical Therapist  Assistant  [id number]

## 2025-07-25 NOTE — PROGRESS NOTES
Physical Therapy Daily Treatment Note    [facility]  [address]  [address]  [place] [address]     Patient: Kenna Will   : 1955  Referring practitioner: Landy Alberts MD  Date of Initial Visit: Type: THERAPY  Noted: 2025  Today's Date: 2025  Patient seen for 3 sessions         Kenna Will reports: felt a little better after the last session - tired vs pain.   [place] some new tips/techniques to manage symptoms/pain.  Nerve glide is helpful when L leg symptoms present.         Subjective     Objective   See Exercise, Manual, and Modality Logs for complete treatment.   Added:  supine clamshell with t band(blue), glute sets - progression to bridge with ppt/ta contraction    Assessment/Plan  Decreased subjective complaints of LLE discomfort, feels stretches are helping. Able to progress exercises without increased discomfort of LBLE, just early fatigue of isolated musculature.  Benefits from verbal/tactile cues to ensure proper exercise performance, technique, positioning.  Should continue to improve with PT intervention.          Progress strengthening /stabilization /functional activity           Timed:  Manual Therapy:         mins  43818;  Therapeutic Exercise:   26      mins  66137;     Neuromuscular Leticia:        mins  91648;    Therapeutic Activity:     14     mins  71387;     Gait Training:           mins  75597;     Ultrasound:          mins  89182;    Self Care                    ___      mins 01357    Untimed:  Electrical Stimulation:         mins  46062 ( );  Mechanical Traction:         mins  00259;     Timed Treatment:   40   mins   Total Treatment:    40    mins  Fer Duvall PTA  Physical Therapist  Assistant  [id number]

## 2025-07-28 ENCOUNTER — TREATMENT (OUTPATIENT)
Age: 70
End: 2025-07-28
Payer: MEDICARE

## 2025-07-28 DIAGNOSIS — M54.50 ACUTE LEFT-SIDED LOW BACK PAIN WITHOUT SCIATICA: Primary | ICD-10-CM

## 2025-07-28 DIAGNOSIS — M54.16 RADICULOPATHY, LUMBAR REGION: ICD-10-CM

## 2025-07-28 DIAGNOSIS — R26.89 DECREASED FUNCTIONAL MOBILITY: ICD-10-CM

## 2025-07-28 NOTE — PROGRESS NOTES
"Physical Therapy Daily Treatment Note    [facility]  [address]  [address]  [place] [address]     Patient: Kenna Will   : 1955  Referring practitioner: Landy Alberts MD  Date of Initial Visit: Type: THERAPY  Noted: 2025  Today's Date: 2025  Patient seen for 4 sessions         Kenna Will reports: mm soreness post session Friday from new [place].  Did note that she was able to get up and go to/from bathroom on  without experiencing L LE symptoms.  Walked 2/3 of flight of stairs today before noting \"mild\" discomfort.        Subjective     Objective   See Exercise, Manual, and Modality Logs for complete treatment.       Assessment/Plan  Subjectively reports improvement of condition since start of PT and performance of exercises.  States that she was able to get up and go to/from the bathroom without experiencing L LE symptoms.  Good recall and performance of current exercise regimen which is a good indicator of home compliance.  Able to progress/perform exercises/activities for affected musculature without increased symptoms.  Should continue to improve with continued rehab efforts.        Progress per Plan of Care toward all goals.  Progress strengthening exercises as appropriate to perform in standing, etc.            Timed:  Manual Therapy:         mins  68354;  Therapeutic Exercise:    28     mins  63476;     Neuromuscular Leticia:        mins  40755;    Therapeutic Activity:    12      mins  45598;     Gait Training:           mins  92484;     Ultrasound:          mins  38516;    Self Care                    ___      mins 61986    Untimed:  Electrical Stimulation:         mins  26962 ( );  Mechanical Traction:         mins  98640;     Timed Treatment:  40    mins   Total Treatment:    40    mins  Fer Duvall PTA  Physical Therapist  Assistant  [id number]  "

## 2025-07-30 ENCOUNTER — TREATMENT (OUTPATIENT)
Age: 70
End: 2025-07-30
Payer: MEDICARE

## 2025-07-30 DIAGNOSIS — M54.50 ACUTE LEFT-SIDED LOW BACK PAIN WITHOUT SCIATICA: Primary | ICD-10-CM

## 2025-07-30 DIAGNOSIS — R26.89 DECREASED FUNCTIONAL MOBILITY: ICD-10-CM

## 2025-07-30 DIAGNOSIS — M54.16 RADICULOPATHY, LUMBAR REGION: ICD-10-CM

## 2025-07-30 NOTE — PROGRESS NOTES
"Physical Therapy Daily Treatment Note  [address]  [facility] [address]  P: [phone]  F: [phone]    Patient: Kenna Will   : 1955  Referring practitioner: Landy Alberts MD  Date of Initial Visit: Type: THERAPY  Noted: 2025  Today's Date: 2025  Patient seen for 5 sessions       Visit Diagnoses:    ICD-10-CM ICD-9-CM   1. Acute left-sided low back pain without sciatica  M54.50 724.2   2. Radiculopathy, lumbar region  M54.16 724.4   3. Decreased functional mobility  R26.89 781.99       Kenna Will reports:     Subjective   Pt reports \"I feel amazingly much better\" \"I wouldn't even describe it as a pain anymore but a muscle soreness\" \"I don't feel as good as I did Monday, but I didn't do some of my stretches, because I had an appointment before this one today\".   Objective   See Exercise, Manual, and Modality Logs for complete treatment.   Added: walk away lumbar stretch, anti-rotational press (lime), isometric core HL, gastroc stretch at stair and wall    Assessment/Plan  Pt tolerated therapeutic interventions well without adverse reactions. Reviewed home exercise program and she demonstrates good understanding and compliance with performing. Updated HEP to include walk away lumbar stretch in standing, she had no c/o low back pain or radiating symptoms with this exercise. She also c/o calf pain when walking down stairs and reports they feel tight, educated her on how to perform gastroc stretch at wall and on step using hand rail for support in order to decrease tightness. Could perform well in clinic and issued hand out of this. Needs verbal cueing to keep band at center during anti rotational press and to increase base of support to improve stability in standing, did well with cue, but needs improvement in her abdominal strength. Continue PT per plan of care.     Timed:         Manual Therapy:    0     mins  04328;     Therapeutic Exercise:    30     mins  " 86294;     Neuromuscular Leticia:    0    mins  63754;    Therapeutic Activity:     8     mins  81028;     Gait Trainin     mins  24202;     Ultrasound:     0     mins  87288;    Ionto                               0    mins   94885  Self Care                       0     mins   70549  Canalith Repos    0     mins 14208      Un-Timed:  Electrical Stimulation:    0     mins  20555 ( );  Dry Needling     0     mins self-pay  Traction     0     mins 40071      Timed Treatment:   38   mins   Total Treatment:     38   mins    Nina Sr, PT  [place] License: [id number]

## 2025-08-04 ENCOUNTER — TREATMENT (OUTPATIENT)
Age: 70
End: 2025-08-04
Payer: MEDICARE

## 2025-08-04 DIAGNOSIS — M54.16 RADICULOPATHY, LUMBAR REGION: ICD-10-CM

## 2025-08-04 DIAGNOSIS — M54.50 ACUTE LEFT-SIDED LOW BACK PAIN WITHOUT SCIATICA: Primary | ICD-10-CM

## 2025-08-04 DIAGNOSIS — R26.89 DECREASED FUNCTIONAL MOBILITY: ICD-10-CM

## 2025-08-04 PROCEDURE — 97530 THERAPEUTIC ACTIVITIES: CPT | Performed by: PHYSICAL THERAPIST

## 2025-08-04 PROCEDURE — 97110 THERAPEUTIC EXERCISES: CPT | Performed by: PHYSICAL THERAPIST

## 2025-08-06 ENCOUNTER — OFFICE VISIT (OUTPATIENT)
Dept: INTERNAL MEDICINE | Facility: CLINIC | Age: 70
End: 2025-08-06
Payer: MEDICARE

## 2025-08-06 VITALS
BODY MASS INDEX: 31.5 KG/M2 | HEIGHT: 66 IN | SYSTOLIC BLOOD PRESSURE: 118 MMHG | HEART RATE: 72 BPM | DIASTOLIC BLOOD PRESSURE: 84 MMHG | WEIGHT: 196 LBS

## 2025-08-06 DIAGNOSIS — M85.80 OSTEOPENIA DUE TO CANCER THERAPY: Chronic | ICD-10-CM

## 2025-08-06 DIAGNOSIS — E06.3 HYPOTHYROIDISM DUE TO HASHIMOTO'S THYROIDITIS: Chronic | ICD-10-CM

## 2025-08-06 DIAGNOSIS — E61.1 IRON DEFICIENCY: Chronic | ICD-10-CM

## 2025-08-06 DIAGNOSIS — C77.3 SECONDARY AND UNSPECIFIED MALIGNANT NEOPLASM OF AXILLA AND UPPER LIMB LYMPH NODES: Chronic | ICD-10-CM

## 2025-08-06 DIAGNOSIS — Z00.00 MEDICARE ANNUAL WELLNESS VISIT, SUBSEQUENT: Primary | ICD-10-CM

## 2025-08-06 DIAGNOSIS — I10 ESSENTIAL HYPERTENSION: Chronic | ICD-10-CM

## 2025-08-06 PROCEDURE — 3074F SYST BP LT 130 MM HG: CPT | Performed by: INTERNAL MEDICINE

## 2025-08-06 PROCEDURE — 1170F FXNL STATUS ASSESSED: CPT | Performed by: INTERNAL MEDICINE

## 2025-08-06 PROCEDURE — 1159F MED LIST DOCD IN RCRD: CPT | Performed by: INTERNAL MEDICINE

## 2025-08-06 PROCEDURE — 1126F AMNT PAIN NOTED NONE PRSNT: CPT | Performed by: INTERNAL MEDICINE

## 2025-08-06 PROCEDURE — G0439 PPPS, SUBSEQ VISIT: HCPCS | Performed by: INTERNAL MEDICINE

## 2025-08-06 PROCEDURE — 3079F DIAST BP 80-89 MM HG: CPT | Performed by: INTERNAL MEDICINE

## 2025-08-06 PROCEDURE — 1160F RVW MEDS BY RX/DR IN RCRD: CPT | Performed by: INTERNAL MEDICINE

## 2025-08-07 ENCOUNTER — TREATMENT (OUTPATIENT)
Age: 70
End: 2025-08-07
Payer: MEDICARE

## 2025-08-07 DIAGNOSIS — M54.16 RADICULOPATHY, LUMBAR REGION: ICD-10-CM

## 2025-08-07 DIAGNOSIS — R26.89 DECREASED FUNCTIONAL MOBILITY: ICD-10-CM

## 2025-08-07 DIAGNOSIS — M54.50 ACUTE LEFT-SIDED LOW BACK PAIN WITHOUT SCIATICA: Primary | ICD-10-CM

## 2025-08-12 ENCOUNTER — TREATMENT (OUTPATIENT)
Age: 70
End: 2025-08-12
Payer: MEDICARE

## 2025-08-12 DIAGNOSIS — M54.50 ACUTE LEFT-SIDED LOW BACK PAIN WITHOUT SCIATICA: Primary | ICD-10-CM

## 2025-08-12 DIAGNOSIS — M54.16 RADICULOPATHY, LUMBAR REGION: ICD-10-CM

## 2025-08-12 DIAGNOSIS — R26.89 DECREASED FUNCTIONAL MOBILITY: ICD-10-CM

## 2025-08-14 ENCOUNTER — TREATMENT (OUTPATIENT)
Age: 70
End: 2025-08-14
Payer: MEDICARE

## 2025-08-14 DIAGNOSIS — M54.16 RADICULOPATHY, LUMBAR REGION: ICD-10-CM

## 2025-08-14 DIAGNOSIS — M54.50 ACUTE LEFT-SIDED LOW BACK PAIN WITHOUT SCIATICA: Primary | ICD-10-CM

## 2025-08-14 DIAGNOSIS — R26.89 DECREASED FUNCTIONAL MOBILITY: ICD-10-CM

## 2025-08-18 ENCOUNTER — TREATMENT (OUTPATIENT)
Age: 70
End: 2025-08-18
Payer: MEDICARE

## 2025-08-18 DIAGNOSIS — M54.16 RADICULOPATHY, LUMBAR REGION: ICD-10-CM

## 2025-08-18 DIAGNOSIS — R26.89 DECREASED FUNCTIONAL MOBILITY: ICD-10-CM

## 2025-08-18 DIAGNOSIS — M54.50 ACUTE LEFT-SIDED LOW BACK PAIN WITHOUT SCIATICA: Primary | ICD-10-CM

## 2025-08-18 PROCEDURE — 97110 THERAPEUTIC EXERCISES: CPT | Performed by: PHYSICAL THERAPIST

## 2025-08-18 PROCEDURE — 97530 THERAPEUTIC ACTIVITIES: CPT | Performed by: PHYSICAL THERAPIST

## 2025-08-21 ENCOUNTER — TREATMENT (OUTPATIENT)
Age: 70
End: 2025-08-21
Payer: MEDICARE

## 2025-08-21 DIAGNOSIS — M54.16 RADICULOPATHY, LUMBAR REGION: ICD-10-CM

## 2025-08-21 DIAGNOSIS — R26.89 DECREASED FUNCTIONAL MOBILITY: ICD-10-CM

## 2025-08-21 DIAGNOSIS — M54.50 ACUTE LEFT-SIDED LOW BACK PAIN WITHOUT SCIATICA: Primary | ICD-10-CM

## 2025-08-28 ENCOUNTER — TREATMENT (OUTPATIENT)
Age: 70
End: 2025-08-28
Payer: MEDICARE

## 2025-08-28 DIAGNOSIS — R26.89 DECREASED FUNCTIONAL MOBILITY: ICD-10-CM

## 2025-08-28 DIAGNOSIS — M54.50 ACUTE LEFT-SIDED LOW BACK PAIN WITHOUT SCIATICA: Primary | ICD-10-CM

## 2025-08-28 DIAGNOSIS — M54.16 RADICULOPATHY, LUMBAR REGION: ICD-10-CM
